# Patient Record
Sex: FEMALE | Race: WHITE
[De-identification: names, ages, dates, MRNs, and addresses within clinical notes are randomized per-mention and may not be internally consistent; named-entity substitution may affect disease eponyms.]

---

## 2018-12-06 ENCOUNTER — HOSPITAL ENCOUNTER (EMERGENCY)
Dept: HOSPITAL 88 - ER | Age: 78
Discharge: HOME | End: 2018-12-06
Payer: MEDICARE

## 2018-12-06 VITALS — BODY MASS INDEX: 36.86 KG/M2 | HEIGHT: 63 IN | WEIGHT: 208 LBS

## 2018-12-06 DIAGNOSIS — Z95.1: ICD-10-CM

## 2018-12-06 DIAGNOSIS — S76.912A: ICD-10-CM

## 2018-12-06 DIAGNOSIS — K21.9: ICD-10-CM

## 2018-12-06 DIAGNOSIS — I10: ICD-10-CM

## 2018-12-06 DIAGNOSIS — G89.29: ICD-10-CM

## 2018-12-06 DIAGNOSIS — M79.652: Primary | ICD-10-CM

## 2018-12-06 PROCEDURE — 99282 EMERGENCY DEPT VISIT SF MDM: CPT

## 2018-12-06 PROCEDURE — 93971 EXTREMITY STUDY: CPT

## 2019-11-22 ENCOUNTER — HOSPITAL ENCOUNTER (OUTPATIENT)
Dept: HOSPITAL 88 - ER | Age: 79
Setting detail: OBSERVATION
LOS: 2 days | Discharge: HOME | End: 2019-11-24
Attending: INTERNAL MEDICINE | Admitting: INTERNAL MEDICINE
Payer: MEDICARE

## 2019-11-22 VITALS — SYSTOLIC BLOOD PRESSURE: 122 MMHG | DIASTOLIC BLOOD PRESSURE: 55 MMHG

## 2019-11-22 VITALS — SYSTOLIC BLOOD PRESSURE: 150 MMHG | DIASTOLIC BLOOD PRESSURE: 65 MMHG

## 2019-11-22 VITALS — DIASTOLIC BLOOD PRESSURE: 65 MMHG | SYSTOLIC BLOOD PRESSURE: 150 MMHG

## 2019-11-22 VITALS — WEIGHT: 227 LBS | BODY MASS INDEX: 40.22 KG/M2 | HEIGHT: 63 IN

## 2019-11-22 VITALS — SYSTOLIC BLOOD PRESSURE: 143 MMHG | DIASTOLIC BLOOD PRESSURE: 59 MMHG

## 2019-11-22 DIAGNOSIS — E66.01: ICD-10-CM

## 2019-11-22 DIAGNOSIS — I25.2: ICD-10-CM

## 2019-11-22 DIAGNOSIS — Z85.850: ICD-10-CM

## 2019-11-22 DIAGNOSIS — E89.0: ICD-10-CM

## 2019-11-22 DIAGNOSIS — I13.0: Primary | ICD-10-CM

## 2019-11-22 DIAGNOSIS — E78.5: ICD-10-CM

## 2019-11-22 DIAGNOSIS — I50.33: ICD-10-CM

## 2019-11-22 DIAGNOSIS — I87.2: ICD-10-CM

## 2019-11-22 DIAGNOSIS — N18.9: ICD-10-CM

## 2019-11-22 DIAGNOSIS — I25.10: ICD-10-CM

## 2019-11-22 DIAGNOSIS — Z95.1: ICD-10-CM

## 2019-11-22 DIAGNOSIS — K21.9: ICD-10-CM

## 2019-11-22 LAB
ALBUMIN SERPL-MCNC: 3.7 G/DL (ref 3.5–5)
ALBUMIN/GLOB SERPL: 1 {RATIO} (ref 0.8–2)
ALP SERPL-CCNC: 78 IU/L (ref 40–150)
ALT SERPL-CCNC: 17 IU/L (ref 0–55)
ANION GAP SERPL CALC-SCNC: 16 MMOL/L (ref 8–16)
BASOPHILS # BLD AUTO: 0.1 10*3/UL (ref 0–0.1)
BASOPHILS NFR BLD AUTO: 0.8 % (ref 0–1)
BUN SERPL-MCNC: 25 MG/DL (ref 7–26)
BUN/CREAT SERPL: 21 (ref 6–25)
CALCIUM SERPL-MCNC: 8.3 MG/DL (ref 8.4–10.2)
CHLORIDE SERPL-SCNC: 100 MMOL/L (ref 98–107)
CK MB SERPL-MCNC: 2.5 NG/ML (ref 0–5)
CK MB SERPL-MCNC: 2.7 NG/ML (ref 0–5)
CK MB SERPL-MCNC: 3 NG/ML (ref 0–5)
CK SERPL-CCNC: 136 IU/L (ref 29–168)
CK SERPL-CCNC: 164 IU/L (ref 29–168)
CK SERPL-CCNC: 165 IU/L (ref 29–168)
CO2 SERPL-SCNC: 28 MMOL/L (ref 22–29)
DEPRECATED APTT PLAS QN: 35.4 SECONDS (ref 23.8–35.5)
DEPRECATED INR PLAS: 0.84
DEPRECATED NEUTROPHILS # BLD AUTO: 4.7 10*3/UL (ref 2.1–6.9)
EGFRCR SERPLBLD CKD-EPI 2021: 45 ML/MIN (ref 60–?)
EOSINOPHIL # BLD AUTO: 0.4 10*3/UL (ref 0–0.4)
EOSINOPHIL NFR BLD AUTO: 5.2 % (ref 0–6)
ERYTHROCYTE [DISTWIDTH] IN CORD BLOOD: 13.2 % (ref 11.7–14.4)
GLOBULIN PLAS-MCNC: 3.8 G/DL (ref 2.3–3.5)
GLUCOSE SERPLBLD-MCNC: 110 MG/DL (ref 74–118)
HCT VFR BLD AUTO: 38.1 % (ref 34.2–44.1)
HGB BLD-MCNC: 12.5 G/DL (ref 12–16)
LYMPHOCYTES # BLD: 2.2 10*3/UL (ref 1–3.2)
LYMPHOCYTES NFR BLD AUTO: 26.1 % (ref 18–39.1)
MCH RBC QN AUTO: 28.7 PG (ref 28–32)
MCHC RBC AUTO-ENTMCNC: 32.8 G/DL (ref 31–35)
MCV RBC AUTO: 87.6 FL (ref 81–99)
MONOCYTES # BLD AUTO: 1 10*3/UL (ref 0.2–0.8)
MONOCYTES NFR BLD AUTO: 11.9 % (ref 4.4–11.3)
NEUTS SEG NFR BLD AUTO: 55.6 % (ref 38.7–80)
PLATELET # BLD AUTO: 286 X10E3/UL (ref 140–360)
POTASSIUM SERPL-SCNC: 4 MMOL/L (ref 3.5–5.1)
PROTHROMBIN TIME: 12 SECONDS (ref 11.9–14.5)
RBC # BLD AUTO: 4.35 X10E6/UL (ref 3.6–5.1)
SODIUM SERPL-SCNC: 140 MMOL/L (ref 136–145)

## 2019-11-22 PROCEDURE — 85025 COMPLETE CBC W/AUTO DIFF WBC: CPT

## 2019-11-22 PROCEDURE — 99284 EMERGENCY DEPT VISIT MOD MDM: CPT

## 2019-11-22 PROCEDURE — 71046 X-RAY EXAM CHEST 2 VIEWS: CPT

## 2019-11-22 PROCEDURE — 80061 LIPID PANEL: CPT

## 2019-11-22 PROCEDURE — 36415 COLL VENOUS BLD VENIPUNCTURE: CPT

## 2019-11-22 PROCEDURE — 83880 ASSAY OF NATRIURETIC PEPTIDE: CPT

## 2019-11-22 PROCEDURE — 83735 ASSAY OF MAGNESIUM: CPT

## 2019-11-22 PROCEDURE — 96365 THER/PROPH/DIAG IV INF INIT: CPT

## 2019-11-22 PROCEDURE — 85610 PROTHROMBIN TIME: CPT

## 2019-11-22 PROCEDURE — 80048 BASIC METABOLIC PNL TOTAL CA: CPT

## 2019-11-22 PROCEDURE — 93306 TTE W/DOPPLER COMPLETE: CPT

## 2019-11-22 PROCEDURE — 85730 THROMBOPLASTIN TIME PARTIAL: CPT

## 2019-11-22 PROCEDURE — 80053 COMPREHEN METABOLIC PANEL: CPT

## 2019-11-22 PROCEDURE — 84484 ASSAY OF TROPONIN QUANT: CPT

## 2019-11-22 PROCEDURE — 93005 ELECTROCARDIOGRAM TRACING: CPT

## 2019-11-22 PROCEDURE — 82550 ASSAY OF CK (CPK): CPT

## 2019-11-22 PROCEDURE — 82553 CREATINE MB FRACTION: CPT

## 2019-11-22 RX ADMIN — FAMOTIDINE SCH MG: 20 TABLET, FILM COATED ORAL at 20:03

## 2019-11-22 RX ADMIN — PANTOPRAZOLE SODIUM SCH MG: 40 TABLET, DELAYED RELEASE ORAL at 15:00

## 2019-11-22 RX ADMIN — FUROSEMIDE SCH MG: 10 INJECTION, SOLUTION INTRAMUSCULAR; INTRAVENOUS at 22:15

## 2019-11-22 RX ADMIN — VALSARTAN SCH MG: 80 TABLET ORAL at 15:00

## 2019-11-22 RX ADMIN — Medication PRN MG: at 22:58

## 2019-11-22 RX ADMIN — FAMOTIDINE SCH MG: 20 TABLET, FILM COATED ORAL at 11:19

## 2019-11-22 RX ADMIN — ISOSORBIDE MONONITRATE SCH MG: 30 TABLET, EXTENDED RELEASE ORAL at 15:00

## 2019-11-22 NOTE — DIAGNOSTIC IMAGING REPORT
EXAMINATION:  CHEST 2 VIEWS    



INDICATION: Shortness of breath



COMPARISON: None

     

FINDINGS:



LINES/TUBES:Neck EKG



LUNGS:The lungs are moderately inflated. Mild biapical pleural parenchymal

thickening/scarring. There is perihilar fullness and indistinctness of the

pulmonary vasculature. 



PLEURA:No pleural effusion or pneumothorax.



MEDIASTINUM:Postoperative findings of prior CABG.



BONES/SOFT TISSUES:No acute osseous injury. Sternotomy wires intact.



ABDOMEN:No free air under the diaphragm.





IMPRESSION: 

Minimal pulmonary interstitial edema. Unchanged mild cardiomegaly.



Signed by: Vivian Mccoy MD on 11/22/2019 11:33 AM

## 2019-11-22 NOTE — XMS REPORT
Summary of Care

                             Created on: 2019



Marii Hewitt

External Reference #: [id number]

: 1940

Sex: Female



Demographics







                          Address                   [address] [place]  [address]

 

                          Home Phone                [phone]

 

                          Preferred Language        English

 

                          Marital Status            

 

                          Yarsanism Affiliation     000

 

                          Race                      White

 

                          Ethnic Group              Non-





Author







                          Author                    [facility]

 

                          Organization              [facility]

 

                          Address                   Unknown

 

                          Phone                     Unavailable







Support







                Name            Relationship    Address         Phone

 

                    Eddie Hewitt     ECON                [address] [place]  [address]                     [phone]







Care Team Providers







                    Care Team Member Name    Role                Phone

 

                    Ana Luisa Bautista MD    PCP                 [phone]







Encounter Details







                          Care Team                 Description



                     Date                Type                Department  

 

                                        



Doctor Unassigned, [place]



[address] [place] [address]                      



                     2019          Patient Secure      [facility]  



                           Msg                       [address] [place] [id number] [phone]  







Allergies







                                        Comments



                 Active Allergy     Reactions       Severity        Noted Date 

 

                                        



Dry Mouth



                 Lisinopril      Other - See     High            2019 



                                         comments   



documented as of this encounter (statuses as of 2019)



Medications







                          End Date                  Status



              Medication     Sig          Dispensed     Refills      Start  



                                         Date  

 

                                                    Active



                 gabapentin 300 mg capsule     Take 600 mg      5                 



                           by mouth at               8  



                                         bedtime.     

 

                                                    Active



                 pantoprazole 40 mg EC     TK 1 T PO       3                 



                     tablet              ONCE D              8  

 

                                                    Active



                 metoprolol succinate XL     TK 1/2 T PO      3                 



                     25 mg 24 hr tablet     ONCE D              8  

 

                                                    Active



                 isosorbide mononitrate 60     TK 1 T PO QD      3                 



                           mg 24 hr tablet           8  

 

                                                    Active



                 furosemide 40 mg tablet     TK 1 T PO  D      3                 



                                         8  

 

                                                    Active



                 calcitriol 0.25 mcg     TK 2 CS PO QD      0                 



                           capsule                   8  

 

                                                    Active



                     aspirin (ASPIRIN LOW     Take 81 mg by       0   



                           DOSE) 81 mg EC tablet     mouth daily.     

 

                                                    Active



                     valsartan (DIOVAN) 40 mg     Take 40 mg by       0   



                           tablet                    mouth daily.     

 

                                                    Active



                     calcium carbonate/vitamin     Take  by            0   



                           D2 (CALCIUM-600-D ORAL)     mouth.     

 

                                                    Active



                     losartan potassium     Take  by            0   



                           (LOSARTAN                 mouth.     



                                         ORAL)Indications:      



                                         Essential hypertension      

 

                                                    Active



                     promethazine-dextromethor     Take 5 mL by        0   



                           tabares 6.25-15 mg/5 mL      mouth 4     



                           syrup                     (four) times     



                                         daily as     



                                         needed for     



                                         Cough.     

 

                                                    Active



              methylPREDNISolone     Take  by     21 Each      0              



                     (MEDROL, DESTINY,) 4 mg     mouth               9  



                           tabletsIndications:       SEE-INSTRUCTI     



                           Bronchitis                ONS. follow     



                                         package     



                                         directions     

 

                                                    Active



              rOPINIRole 0.5 mg     Take 1 tablet     90 tablet     1              



                     tabletIndications:     by mouth at         9  



                           Restless leg syndrome     bedtime.     

 

                                                    Active



                     VITAMIN K2 ORAL     Take 1 tablet       0   



                                         by mouth     



                                         daily.     

 

                                                    Active



              levothyroxine 112 mcg     TAKE 1 TABLET     60 tablet     0              



                     tablet              BY MOUTH            9  



                                         EVERY MORNING     



                                         ON AN EMPTY     



                                         STOMACH WITH     



                                         WATER, AND     



                                         WAIT 1 HOUR     



                                         BEFORE EATING     



                                         OR TAKING     



                                         OTHER MEDS     



documented as of this encounter (statuses as of 2019)



Active Problems





Not on filedocumented as of this encounter (statuses as of 2019)



Social History







                                        Date



                 Tobacco Use     Types           Packs/Day       Years Used 

 

                                         



                                         Never Smoker    

 

    



                                         Smokeless Tobacco: Never   



                                         Used   









                    Drinks/Week         oz/Week             Comments



                                         Alcohol Use   

 

                                                             



                                         Yes   









 



                           Sex Assigned at Birth     Date Recorded

 

 



                                         Not on file 









                                        Industry



                           Job Start Date            Occupation 

 

                                        Not on file



                           Not on file               Not on file 









                                        Travel End



                           Travel History            Travel Start 

 





                                         No recent travel history available.



documented as of this encounter



Last Filed Vital Signs

Not on filedocumented in this encounter



Plan of Treatment







   



                 Health Maintenance     Due Date        Last Done       Comments

 

   



                           DTaP,Tdap,and Td Vaccines     1960  



                                         (1 - Tdap)   

 

   



                           Zoster Recombinant        1991  



                                         Vaccine (SHINGRIX) (1 of   



                                         2)   

 

   



                           Medicare Wellness Visit     2006  

 

   



                           Osteoporosis Screening     2006  

 

   



                           PNEUMOCOCCAL VACCINES 65+     2006  



                                         (1 of 2 - PCV13)   

 

   



                           INFLUENZA VACCINE         2019  



documented as of this encounter



Results

Not on filedocumented in this encounter



Insurance







                                        Type



            Payer      Benefit     Subscriber ID     Effective     Phone      Address 



                           Plan /                    Dates   



                                         Group     

 

                                        Medicare Adv O



                 [facility] -     [employer]            [id number]       2019-P   



                     MANAGED MEDICARE     MEDICARE            resent   



                                         COMPLETE     



documented as of this encounter

## 2019-11-22 NOTE — XMS REPORT
Summary of Care

                             Created on: 2019



Marii Hewitt

External Reference #: [id number]

: 1940

Sex: Female



Demographics







                          Address                   [address] [place] [address]  [address]

 

                          Home Phone                [phone]

 

                          Preferred Language        English

 

                          Marital Status            

 

                          Orthodox Affiliation     000

 

                          Race                      White

 

                          Ethnic Group              Non-





Author







                          Author                    [facility]

 

                          Organization              [facility]

 

                          Address                   Unknown

 

                          Phone                     Unavailable







Support







                Name            Relationship    Address         Phone

 

                    Eddie Hewitt     [employer]                [address] [place]  [address]                     [phone]







Care Team Providers







                    Care Team Member Name    Role                Phone

 

                    Ana Luisa Bautista MD    PCP                 [phone]







Reason for Visit

* 





 



                           Reason                    Comments

 

 



                                         Refill Request 









Encounter Details







                          Care Team                 Description



                     Date                Type                Department  

 

                                        



Ana Luisa Bautista MD



[address] [place] [address] [phone] (Fax)                      Refill Request



                     2019          Telephone           [facility]  



                                         Adult Primary Care, [address]  



                                         floor  



                                         [place] [address] [phone]  







Allergies







                                        Comments



                 Active Allergy     Reactions       Severity        Noted Date 

 

                                        



Dry Mouth



                 Lisinopril      Other - See     High            2019 



                                         comments   



documented as of this encounter (statuses as of 2019)



Medications







                          End Date                  Status



              Medication     Sig          Dispensed     Refills      Start  



                                         Date  

 

                                                    Active



                 gabapentin 300 mg capsule     Take 600 mg      5                 



                           by mouth at               8  



                                         bedtime.     

 

                                                    Active



                 pantoprazole 40 mg EC     TK 1 T PO       3                 



                     tablet              ONCE D              8  

 

                                                    Active



                 metoprolol succinate XL     TK 1/2 T PO      3                 



                     25 mg 24 hr tablet     ONCE D              8  

 

                                                    Active



                 isosorbide mononitrate 60     TK 1 T PO QD      3                 



                           mg 24 hr tablet           8  

 

                                                    Active



                 furosemide 40 mg tablet     TK 1 T PO  D      3                 



                                         8  

 

                                                    Active



                 calcitriol 0.25 mcg     TK 2 CS PO QD      0                 



                           capsule                   8  

 

                                                    Active



                     aspirin (ASPIRIN LOW     Take 81 mg by       0   



                           DOSE) 81 mg EC tablet     mouth daily.     

 

                                                    Active



                     valsartan (DIOVAN) 40 mg     Take 40 mg by       0   



                           tablet                    mouth daily.     

 

                                                    Active



                     calcium carbonate/vitamin     Take  by            0   



                           D2 (CALCIUM-600-D ORAL)     mouth.     

 

                                                    Active



                     losartan potassium     Take  by            0   



                           (LOSARTAN                 mouth.     



                                         ORAL)Indications:      



                                         Essential hypertension      

 

                                                    Active



                     promethazine-dextromethor     Take 5 mL by        0   



                           tabares 6.25-15 mg/5 mL      mouth 4     



                           syrup                     (four) times     



                                         daily as     



                                         needed for     



                                         Cough.     

 

                                                    Active



              methylPREDNISolone     Take  by     21 Each      0              



                     (MEDROL, DESTINY,) 4 mg     mouth               9  



                           tabletsIndications:       SEE-INSTRUCTI     



                           Bronchitis                ONS. follow     



                                         package     



                                         directions     

 

                                                    Active



              rOPINIRole 0.5 mg     Take 1 tablet     90 tablet     1              



                     tabletIndications:     by mouth at         9  



                           Restless leg syndrome     bedtime.     

 

                                                    Active



                     VITAMIN K2 ORAL     Take 1 tablet       0   



                                         by mouth     



                                         daily.     

 

                                                    Active



              losartan 25 mg     Take 1 tablet     30 tablet     1              



                     tabletIndications:     by mouth            9  



                           Essential hypertension     daily.     

 

                                                    Active



              POTASSIUM CHLORIDE 10 mEq     TAKE 1 TABLET     30 tablet     0              



                     CR tablet           BY MOUTH            9  



                                         EVERY DAY     

 

                                                    Active



              levothyroxine 112 mcg     TAKE 1 TABLET     90 tablet     0              



                     tablet              BY MOUTH            9  



                                         EVERY MORNING     



                                         ON AN EMPTY     



                                         STOMACH WITH     



                                         WATER, AND     



                                         WAIT 1 HOUR     



                                         BEFORE EATING     



                                         OR TAKING     



                                         OTHER MEDS     

 

                          2019                Discontinued



              levothyroxine 112 mcg     TAKE 1 TABLET     60 tablet     0              



                     tablet              BY MOUTH            9  



                                         EVERY MORNING     



                                         ON AN EMPTY     



                                         STOMACH WITH     



                                         WATER, AND     



                                         WAIT 1 HOUR     



                                         BEFORE EATING     



                                         OR TAKING     



                                         OTHER MEDS     



documented as of this encounter (statuses as of 2019)



Active Problems





Not on filedocumented as of this encounter (statuses as of 2019)



Social History







                                        Date



                 Tobacco Use     Types           Packs/Day       Years Used 

 

                                         



                                         Never Smoker    

 

    



                                         Smokeless Tobacco: Never   



                                         Used   









                    Drinks/Week         oz/Week             Comments



                                         Alcohol Use   

 

                                                             



                                         Yes   









 



                           Sex Assigned at Birth     Date Recorded

 

 



                                         Not on file 









                                        Industry



                           Job Start Date            Occupation 

 

                                        Not on file



                           Not on file               Not on file 









                                        Travel End



                           Travel History            Travel Start 

 





                                         No recent travel history available.



documented as of this encounter



Last Filed Vital Signs

Not on filedocumented in this encounter



Plan of Treatment







                          Care Team                 Description



                     Date                Type                Specialty  

 

                                        



Ana Luisa Bautista MD



[address] [place] [id number] [phone] (Fax)                       



                     2019          Office Visit        Family Medicine  









   



                 Health Maintenance     Due Date        Last Done       Comments

 

   



                           DTaP,Tdap,and Td Vaccines     1960  



                                         (1 - Tdap)   

 

   



                           Zoster Recombinant        1991  



                                         Vaccine (SHINGRIX) (1 of   



                                         2)   

 

   



                           Medicare Wellness Visit     2006  

 

   



                           Osteoporosis Screening     2006  

 

   



                           PNEUMOCOCCAL VACCINES 65+     2006  



                                         (1 of 2 - PCV13)   

 

   



                           INFLUENZA VACCINE (#1)     2019  



documented as of this encounter



Results

Not on filedocumented in this encounter



Insurance







                                        Type



            Payer      Benefit     Subscriber ID     Effective     Phone      Address 



                           Plan /                    Dates   



                                         Group     

 

                                        Medicare Adv O



                 [facility] -     [employer]            [id number]       2019-P   



                     MANAGED MEDICARE     MEDICARE            resent   



                                         COMPLETE     



documented as of this encounter

## 2019-11-22 NOTE — XMS REPORT
Summary of Care

                             Created on: 2019



Marii Hewitt

External Reference #: [id number]

: 1940

Sex: Female



Demographics







                          Address                   [address] [place] [address]  [address]

 

                          Home Phone                [phone]

 

                          Preferred Language        English

 

                          Marital Status            

 

                          Mormonism Affiliation     000

 

                          Race                      White

 

                          Ethnic Group              Non-





Author







                          Author                    [facility]

 

                          Organization              [facility]

 

                          Address                   Unknown

 

                          Phone                     Unavailable







Support







                Name            Relationship    Address         Phone

 

                    Eddie Hewitt     [employer]                [address] [place]  [address]                     [phone]







Care Team Providers







                    Care Team Member Name    Role                Phone

 

                    Ana Luisa Bautista MD    PCP                 [phone]







Reason for Visit

* 





 



                           Reason                    Comments

 

 



                                         Pain 









Encounter Details







                          Care Team                 Description



                     Date                Type                Department  

 

                                        



Ana Luisa Bautista MD



[address]



[place] [address]



[phone] (Fax)                      Restless leg syndrome (Primary Dx); 

Motion sickness, initial encounter; 

Diarrhea, unspecified type; 

Acquired hypothyroidism



                     2019          Office Visit        [facility]  



                                         [facility]  



                                         [facility]  



                                         [address]  



                                         [place]  



                                         [address] [phone]  







Allergies







                                        Comments



                 Active Allergy     Reactions       Severity        Noted Date 

 

                                        



Dry Mouth



                 Lisinopril      Other - See     High            2019 



                                         comments   



documented as of this encounter (statuses as of 2019)



Medications







                          End Date                  Status



              Medication     Sig          Dispensed     Refills      Start  



                                         Date  

 

                                                    Active



                 gabapentin 300 mg capsule     Take 600 mg      5                 



                           by mouth at               8  



                                         bedtime.     

 

                                                    Active



                 pantoprazole 40 mg EC     TK 1 T PO       3                 



                     tablet              ONCE D              8  

 

                                                    Active



                 metoprolol succinate XL     TK 1/2 T PO      3                 



                     25 mg 24 hr tablet     ONCE D              8  

 

                                                    Active



                 isosorbide mononitrate 60     TK 1 T PO QD      3                 



                           mg 24 hr tablet           8  

 

                                                    Active



                 furosemide 40 mg tablet     TK 1 T PO  D      3                 



                                         8  

 

                                                    Active



                 calcitriol 0.25 mcg     TK 2 CS PO QD      0                 



                           capsule                   8  

 

                                                    Active



                     aspirin (ASPIRIN LOW     Take 81 mg by       0   



                           DOSE) 81 mg EC tablet     mouth daily.     

 

                                                    Active



                     valsartan (DIOVAN) 40 mg     Take 40 mg by       0   



                           tablet                    mouth daily.     

 

                                                    Active



                     calcium carbonate/vitamin     Take  by            0   



                           D2 (CALCIUM-600-D ORAL)     mouth.     

 

                                                    Active



                     losartan potassium     Take  by            0   



                           (LOSARTAN                 mouth.     



                                         ORAL)Indications:      



                                         Essential hypertension      

 

                                                    Active



                     promethazine-dextromethor     Take 5 mL by        0   



                           tabares 6.25-15 mg/5 mL      mouth 4     



                           syrup                     (four) times     



                                         daily as     



                                         needed for     



                                         Cough.     

 

                                                    Active



              methylPREDNISolone     Take  by     21 Each      0              



                     (MEDROL, DESTINY,) 4 mg     mouth               9  



                           tabletsIndications:       SEE-INSTRUCTI     



                           Bronchitis                ONS. follow     



                                         package     



                                         directions     

 

                                                    Active



                     VITAMIN K2 ORAL     Take 1 tablet       0   



                                         by mouth     



                                         daily.     

 

                                                    Active



              losartan 25 mg     Take 1 tablet     30 tablet     1              



                     tabletIndications:     by mouth            9  



                           Essential hypertension     daily.     

 

                                                    Active



              levothyroxine 112 mcg     TAKE 1 TABLET     90 tablet     0              



                     tablet              BY MOUTH            9  



                                         EVERY MORNING     



                                         ON AN EMPTY     



                                         STOMACH WITH     



                                         WATER, AND     



                                         WAIT 1 HOUR     



                                         BEFORE EATING     



                                         OR TAKING     



                                         OTHER MEDS     

 

                                                    Active



              POTASSIUM CHLORIDE 10 mEq     TAKE 1 TABLET     30 tablet     0              



                     CR tablet           BY MOUTH            9  



                                         EVERY DAY     

 

                                                    Active



                     ergocalciferol, vitamin     Take 4,000          0   



                           D2, (VITAMIN D ORAL)      Units by     



                                         mouth daily.     

 

                                                    Active



              scopolamine transdermal 1     Apply 1 Patch     4 Patch      0              



                     mg over 3 days      to area(s)          9  



                           patchIndications: Motion     every 72     



                           sickness, initial         (seventy-two)     



                           encounter                 hours.     

 

                                                    Active



              rOPINIRole 0.5 mg     2 tablets 1-3     90 tablet     0              



                     tabletIndications:     hrs prior to        9  



                           Restless leg syndrome     bedtime x 7     



                                         days then if     



                                         needed can go     



                                         up weekly by     



                                         0.5 mg until     



                                         desired     



                                         effect or she     



                                         reaches 3 mg.     

 

                                                    Active



              imipramine 25 mg     Take 1 tablet     30 tablet     2              



                     tabletIndications:     by mouth            9  



                           Diarrhea, unspecified     daily.     



                                         type      

 

                          2019                Discontinued



              rOPINIRole 0.5 mg     Take 1 tablet     90 tablet     1              



                     tabletIndications:     by mouth at         9  



                           Restless leg syndrome     bedtime.     



documented as of this encounter (statuses as of 2019)



Active Problems





Not on filedocumented as of this encounter (statuses as of 2019)



Social History







                                        Date



                 Tobacco Use     Types           Packs/Day       Years Used 

 

                                         



                                         Never Smoker    

 

    



                                         Smokeless Tobacco: Never   



                                         Used   









                    Drinks/Week         oz/Week             Comments



                                         Alcohol Use   

 

                                                             



                                         Yes   









 



                           Sex Assigned at Birth     Date Recorded

 

 



                                         Not on file 









                                        Industry



                           Job Start Date            Occupation 

 

                                        Not on file



                           Not on file               Not on file 









                                        Travel End



                           Travel History            Travel Start 

 





                                         No recent travel history available.



documented as of this encounter



Last Filed Vital Signs







                    Reading             Time Taken          Comments



                                         Vital Sign   

 

                    134/82              2019  3:42 PM CDT     



                                         Blood Pressure   

 

                    64                  2019  3:42 PM CDT     



                                         Pulse   

 

                    -                   -                    



                                         Temperature   

 

                    -                   -                    



                                         Respiratory Rate   

 

                    99%                 2019  3:42 PM CDT     



                                         Oxygen Saturation   

 

                    -                   -                    



                                         Inhaled Oxygen   



                                         Concentration   

 

                    103.1 kg (227 lb 3.2 oz)    2019  3:42 PM CDT     



                                         Weight   

 

                    -                   -                    



                                         Height   

 

                    42.58               2019  2:02 PM CDT     



                                         Body Mass Index   



documented in this encounter



Progress Notes

* Ana Luisa Bautista MD - 2019  3:00 PM CDT



Formatting of this note might be different from the original.

Cc: 

Chief Complaint 

Patient presents with 

 Pain 



Marii Hewitt is a 79 year old female.

HPI 

She is going on a cruise in October and is requesting motion sickness pills.

Patient states that for years she has had issues with on and off diarrhea .  But
lately she has had issues with random unprovoked episodes of diarrhea that she 
has no control over.  

In both ears she has had a constant swooshing sound for 2 weeks.  Patient states
Dr. Short stopped her calcitriol.  she is now taking Calcium 1800 mg and vitamin D 
4000 iu.  Patient states that her calcium level is low.  Patient states that ha
ving bone and muscle achiness, slight better when she re-started her calcium sup
plement.

Check how much parathyroid she has left.  

Neuropathy pain comes and goes in degrees.  ropiranole will help.  

Patient states she has a tightening of the throat. Makes it hard to move her jaw
.  She denies any shortness of breath.

She has itching under her underarm.  Patient states that has bumps and itching i
n groin. Patient states on and off.  

Patient saw Dr. Cartwright.  She is setting her up for a Nuclear stress test and Ec
hocardiogram.  Stress test will be done after she returns from her cruise.  



Allergies

Marii is allergic to lisinopril.



Medications

Outpatient Medications Prior to Visit 

Medication Sig Dispense Refill 

 ergocalciferol, vitamin D2, (VITAMIN D ORAL) Take 4,000 Units by mouth daily
.   

 POTASSIUM CHLORIDE 10 mEq CR tablet TAKE 1 TABLET BY MOUTH EVERY DAY 30 tabl
et 0 

 levothyroxine 112 mcg tablet TAKE 1 TABLET BY MOUTH EVERY MORNING ON AN EMPT
Y STOMACH WITH WATER, AND WAIT 1 HOUR BEFORE EATING OR TAKING OTHER MEDS 90 tabl
et 0 

 losartan 25 mg tablet Take 1 tablet by mouth daily. 30 tablet 1 

 VITAMIN K2 ORAL Take 1 tablet by mouth daily.   

 rOPINIRole 0.5 mg tablet Take 1 tablet by mouth at bedtime. 90 tablet 1 

 aspirin (ASPIRIN LOW DOSE) 81 mg EC tablet Take 81 mg by mouth daily.   

 furosemide 40 mg tablet TK 1 T PO  D  3 

 gabapentin 300 mg capsule Take 600 mg by mouth at bedtime.  5 

 isosorbide mononitrate 60 mg 24 hr tablet TK 1 T PO QD  3 

 metoprolol succinate XL 25 mg 24 hr tablet TK 1/2 T PO ONCE D  3 

 pantoprazole 40 mg EC tablet TK 1 T PO ONCE D  3 

 methylPREDNISolone (MEDROL, DESTINY,) 4 mg tablets Take  by mouth SEE-INSTRUCTIO
NS. follow package directions 21 Each 0 

 promethazine-dextromethorphan 6.25-15 mg/5 mL syrup Take 5 mL by mouth 4 (fo
ur) times daily as needed for Cough.   

 losartan potassium (LOSARTAN ORAL) Take  by mouth.   

 calcitriol 0.25 mcg capsule TK 2 CS PO QD  0 

 calcium carbonate/vitamin D2 (CALCIUM-600-D ORAL) Take  by mouth.   

 valsartan (DIOVAN) 40 mg tablet Take 40 mg by mouth daily.   



No facility-administered medications prior to visit.  



Current Outpatient Medications: 

  ergocalciferol, vitamin D2, (VITAMIN D ORAL), Take 4,000 Units by mouth jasson gonzales, Disp: , Rfl: 

  scopolamine transdermal 1 mg over 3 days patch, Apply 1 Patch to area(s) ev
jt 72 (seventy-two) hours., Disp: 4 Patch, Rfl: 0

  POTASSIUM CHLORIDE 10 mEq CR tablet, TAKE 1 TABLET BY MOUTH EVERY DAY, Disp
: 30 tablet, Rfl: 0

  levothyroxine 112 mcg tablet, TAKE 1 TABLET BY MOUTH EVERY MORNING ON AN EM
PTY STOMACH WITH WATER, AND WAIT 1 HOUR BEFORE EATING OR TAKING OTHER MEDS, Disp
: 90 tablet, Rfl: 0

  losartan 25 mg tablet, Take 1 tablet by mouth daily., Disp: 30 tablet, Rfl:
1

  VITAMIN K2 ORAL, Take 1 tablet by mouth daily., Disp: , Rfl: 

  rOPINIRole 0.5 mg tablet, Take 1 tablet by mouth at bedtime., Disp: 90 tabl
et, Rfl: 1

  aspirin (ASPIRIN LOW DOSE) 81 mg EC tablet, Take 81 mg by mouth daily., Dis
p: , Rfl: 

  furosemide 40 mg tablet, TK 1 T PO  D, Disp: , Rfl: 3

  gabapentin 300 mg capsule, Take 600 mg by mouth at bedtime., Disp: , Rfl: 5

  isosorbide mononitrate 60 mg 24 hr tablet, TK 1 T PO QD, Disp: , Rfl: 3

  metoprolol succinate XL 25 mg 24 hr tablet, TK 1/2 T PO ONCE D, Disp: , Rfl
: 3

  pantoprazole 40 mg EC tablet, TK 1 T PO ONCE D, Disp: , Rfl: 3

  methylPREDNISolone (MEDROL, DESTINY,) 4 mg tablets, Take  by mouth SEE-INSTRUCT
IONS. follow package directions, Disp: 21 Each, Rfl: 0

  promethazine-dextromethorphan 6.25-15 mg/5 mL syrup, Take 5 mL by mouth 4 (
four) times daily as needed for Cough., Disp: , Rfl: 

  losartan potassium (LOSARTAN ORAL), Take  by mouth., Disp: , Rfl: 

  calcitriol 0.25 mcg capsule, TK 2 CS PO QD, Disp: , Rfl: 0

  calcium carbonate/vitamin D2 (CALCIUM-600-D ORAL), Take  by mouth., Disp: ,
Rfl: 

  valsartan (DIOVAN) 40 mg tablet, Take 40 mg by mouth daily., Disp: , Rfl: 



Histories

Past Medical History: 

Diagnosis Date 

 B12 deficiency  

 CHF (congestive heart failure)  

 Coronary artery disease  

 Diverticulosis  

 Gallstones  

 Heart murmur  

 Hypertension  

 Hypothyroid  

 Insomnia  

 Internal hemorrhoids  

 Meniere's disease  



Past Surgical History: 

Procedure Laterality Date 

 D&C AFTER DELIVERY   

 THYROIDECTOMY   



Social History 



Socioeconomic History 

 Marital status:  

  Spouse name: Not on file 

 Number of children: Not on file 

 Years of education: Not on file 

 Highest education level: Not on file 

Occupational History 

 Not on file 

Social Needs 

 Financial resource strain: Not on file 

 Food insecurity: 

  Worry: Not on file 

  Inability: Not on file 

 Transportation needs: 

  Medical: Not on file 

  Non-medical: Not on file 

Tobacco Use 

 Smoking status: Never Smoker 

 Smokeless tobacco: Never Used 

Substance and Sexual Activity 

 Alcohol use: Yes 

 Drug use: No 

 Sexual activity: Never 

Lifestyle 

 Physical activity: 

  Days per week: Not on file 

  Minutes per session: Not on file 

 Stress: Not on file 

Relationships 

 Social connections: 

  Talks on phone: Not on file 

  Gets together: Not on file 

  Attends Scientologist service: Not on file 

  Active member of club or organization: Not on file 

  Attends meetings of clubs or organizations: Not on file 

  Relationship status: Not on file 

 Intimate partner violence: 

  Fear of current or ex partner: Not on file 

  Emotionally abused: Not on file 

  Physically abused: Not on file 

  Forced sexual activity: Not on file 

Other Topics Concern 

 Not on file 

Social History Narrative 

 Not on file 



Family History 

Problem Relation Age of Onset 

 Diabetes Mother  

 Hypertension Mother  

 Heart Mother  

     TIA 

 Depression Mother  

 Hypertension Father  

 Diabetes Father  

 Heart Father  



Review of Systems 

Constitutional: Negative.  

Cardiovascular: Negative.  

Gastrointestinal: Positive for abdominal pain and diarrhea. 

Genitourinary: Negative.  

Musculoskeletal: Positive for arthralgias and myalgias. Negative for neck pain. 

Skin: Positive for rash. 

Neurological: Positive for numbness. Negative for headaches. 

     Restless legs 

Psychiatric/Behavioral: Positive for sleep disturbance. 

Endocrine: Endocrine negative



Vital Signs

/82 (BP Location: Left arm, Patient Position: Sitting)  | Pulse 64  | Wt 2
27 lb 3.2 oz (103.1 kg)  | SpO2 99%  | BMI 42.58 kg/m 



Physical Exam 

Constitutional: She appears well-developed and well-nourished. 

HENT: 

Right Ear: External ear normal. 

Left Ear: External ear normal. 

Mouth/Throat: Oropharynx is clear and moist. 

Eyes: Conjunctivae are normal. 

Neck: Neck supple. 

Cardiovascular: Normal rate, regular rhythm and normal heart sounds. 

Pulmonary/Chest: Effort normal and breath sounds normal. No respiratory distress
. 

Musculoskeletal: Normal range of motion. 

Lymphadenopathy: 

  She has no cervical adenopathy. 

Neurological: She is alert. 

Vitals reviewed.



Assessment/Plan

1. Restless leg syndrome

Will increase and maximize patients Ropinirole.  If this doesn't help we may nee
d to increase her gabapentin.

- rOPINIRole 0.5 mg tablet; 2 tablets 1-3 hrs prior to bedtime x 7 days then if 
needed can go up weekly by 0.5 mg until desired effect or she reaches 3 mg.  Dis
pense: 90 tablet; Refill: 0



2. Motion sickness, initial encounter

- scopolamine transdermal 1 mg over 3 days patch; Apply 1 Patch to area(s) every
72 (seventy-two) hours.  Dispense: 4 Patch; Refill: 0



3. Diarrhea, unspecified type

Will try patient on Imipramine for the diarrhea and will check her blood work to
make sure her diarrhea is not metabolically induced.  Review of her labs show t
hat all her labs are essentially normal.  

- imipramine 25 mg tablet; Take 1 tablet by mouth daily.  Dispense: 30 tablet; R
efill: 2

- CBC WITH DIFFERENTIAL

- COMP. METABOLIC PANEL (53428)

- LIPASE



4. Acquired hypothyroidism

In may her levels were in the hyperthyroid side which can cause diarrhea, repeat
shows normal value.  

- FREE T4

- THYROID STIMULATING HORMONE



This visit did not involve counseling and coordination that comprised more than 
50% of the visit time. 



Electronically signed by Ana Luisa Bautista MD at 2019  4:11 PM CDT

documented in this encounter



Plan of Treatment







                          Care Team                 Description



                     Date                Type                Specialty  

 

                                        



Ana Luisa Bautista MD



[address] [place] [id number] [phone] (Fax)                       



                     2020          Office Visit        Family Medicine  









   



                 Health Maintenance     Due Date        Last Done       Comments

 

   



                           DTaP,Tdap,and Td Vaccines     1960  



                                         (1 - Tdap)   

 

   



                           Zoster Recombinant        1991  



                                         Vaccine (SHINGRIX) (1 of   



                                         2)   

 

   



                           Medicare Wellness Visit     2006  

 

   



                           Osteoporosis Screening     2006  

 

   



                           PNEUMOCOCCAL VACCINES 65+     2006  



                                         (1 of 2 - PCV13)   

 

   



                           INFLUENZA VACCINE (#1)     2019  



documented as of this encounter



Procedures







                                        Comments



                 Procedure Name     Priority        Date/Time       Associated Diagnosis 

 

                                        



Results for this procedure are in the results section.



                 CBC WITH DIFFERENTIAL     Routine         2019      Diarrhea, unspecified 



                           5:21 PM CDT               type 

 

                                        



Results for this procedure are in the results section.



                 COMP. METABOLIC PANEL     Routine         2019      Diarrhea, unspecified 



                     (86868)             5:21 PM CDT         type 

 

                                        



Results for this procedure are in the results section.



                 THYROID STIMULATING     Routine         2019      Acquired hypothyroidism 



                           HORMONE                   5:21 PM CDT  

 

                                        



Results for this procedure are in the results section.



                 FREE T4         Routine         2019      Acquired hypothyroidism 



                                         5:21 PM CDT  

 

                                        



Results for this procedure are in the results section.



                 LIPASE          Routine         2019      Diarrhea, unspecified 



                           5:21 PM CDT               type 



documented in this encounter



Results

* LIPASE (2019  5:21 PM CDT)





    



              Component     Value        Ref Range     Performed At     Pathologist



                                         Signature

 

    



                 LIPASE          42              0 - 220 U/L     [facility] 



                                         [facility] 













                                         Specimen

 





                                         Blood - ARM, RIGHT









   



                 Performing Organization     Address         City/[facility]/Zipcode     Phone Number

 

   



                 [facility]     CLIA: [id number], [address] [place] [address]     [phone]





                           [facility]   





* THYROID STIMULATING HORMONE (2019  5:21 PM CDT)





    



              Component     Value        Ref Range     Performed At     Pathologist



                                         Signature

 

    



                 TSH             1.88            0.45 - 4.70 mIU/L     [facility] 



                                         [facility] 













                                         Specimen

 





                                         Blood - ARM, RIGHT









   



                 Performing Organization     Address         City/State/Zipcode     Phone Number

 

   



                 [facility]     CLIA: [id number], [address] [place] [address]     [phone]





                           [facility]   





* FREE T4 (2019  5:21 PM CDT)





    



              Component     Value        Ref Range     Performed At     Pathologist



                                         Signature

 

    



                 FREE T4         1.34            0.78 - 2.20 ng/dL:     [facility] 



                                         [facility] 













                                         Specimen

 





                                         Blood - ARM, RIGHT









   



                 Performing Organization     Address         City/[facility]/Zipcode     Phone Number

 

   



                 [facility]     CLIA:  [id number], [address] [place] [id number] [phone]





                                         [address]  





* COMP. METABOLIC PANEL (12239) (2019  5:21 PM CDT)





    



              Component     Value        Ref Range     Performed At     Pathologist



                                         Signature

 

    



                 NA              143             135 - 145 mmol/L     [facility] 



                                         [facility] 

 

    



                 K               4.4             3.5 - 5.0 mmol/L     [facility] 



                                         [facility] 

 

    



                 CL              102             98 - 108 mmol/L     [facility] 



                                         [facility] 

 

    



                 CO2 TOTAL       30              23 - 31 mmol/L     [facility] 



                                         [facility] 

 

    



                 AGAP            11              2 - 16          [facility] 



                                         [facility] 

 

    



                 BUN             37 (H)          7 - 23 mg/dL     [facility] 



                                         [facility] 

 

    



                 GLUCOSE         107             70 - 110 mg/dL     [facility] 



                                         [facility] 

 

    



                 CREATININE      1.25 (H)        0.50 - 1.04 mg/dL     [facility] 



                                         [facility] 

 

    



                 TOTAL BILI      0.6             0.1 - 1.1 mg/dL     [facility] 



                                         [facility] 

 

    



                 CALCIUM         8.2 (L)         8.6 - 10.6 mg/dL     [facility] 



                                         [facility] 

 

    



                 T PROTEIN       7.6             6.3 - 8.2 g/dL     [facility] 

 

    



                 ALBUMIN         4.2             3.5 - 5.0 g/dL     [facility] 



                                         [facility] 

 

    



                 ALK PHOS        70              34 - 122 U/L     [facility] 

 

    



                 ALT(SGPT)       19              9 - 51 U/L      [facility] 

 

    



                 AST(SGOT)       27              13 - 40 U/L     [facility] 



                                         [facility] 

 

    



                 eGFR            41.4            mL/min/1.73m2     [facility] 



                           Calculation               [facility] 



                           ([facility] 



                                         American)    

 

    



                 eGFR            50.2            mL/min/1.73m2     [facility] 



                           Calculation               [facility] 



                           ([facility] 



                                         American)    













                                         Specimen

 





                                         Blood - ARM, RIGHT









 



                           Narrative                 Performed At

 

 



                                         Association of Glomerular Filtration Rate (GFR) and Staging of Kidney Disease* 

                                         [facility]



                           +-----------------------+---------------------+-------------------------+     [facility]



                           | GFR (mL/min/1.73 m2)| With Kidney Damage|Without Kidney Damage     

CAMPUS



                                         +-----------------------+---------------------+-------------------------+ 



                                         |>90|Stage 



                                         one| Normal 



                                         +-----------------------+---------------------+-------------------------+ 



                                         |60-89|Stage 



                                         two| Decreased GFR 



                                         +-----------------------+---------------------+-------------------------+ 



                                         |30-59|Stage three|

 



                                         Stage three 



                                         +-----------------------+---------------------+-------------------------+ 



                                         |15-29|Stage four |

 



                                         Stage four 



                                         +-----------------------+---------------------+-------------------------+ 



                                         |<15 (or dialysis)|Stage five | Stage 



                                         five 



                                         +-----------------------+---------------------+-------------------------+ 



                                         *Each stage assumes the associated GFR level has been in effect for at least 



                                         three months.Stages 1 to 5, with or without kidney disease, indicate chronic

 



                                         kidney disease. 



                                         Notes: Determination of stages one and two (with eGFR >59mL/min/1.73 m2) 



                                         requires estimation of kidney damage for at least three months as defined by 



                                         structural or functional abnormalities of the kidney, manifested by either: 



                                         Pathological abnormalities or Markers of kidney damage (including abnormalities

 



                                         in the composition of the blood or urine or abnormalities in imaging tests). 









   



                 Performing Organization     Address         City/State/Zipcode     Phone Number

 

   



                 [facility]     CLIA: [id number], [address] [place] [address]     [phone]





                           [facility]   





* CBC WITH DIFFERENTIAL (2019  5:21 PM CDT)





    



              Component     Value        Ref Range     Performed At     Pathologist



                                         Signature

 

    



                 WBC             8.27            4.30 - 11.10     [facility] 



                           10*3/L                  [facility] 

 

    



                 RBC             4.34            3.93 - 5.25 10*6/L     [facility] 

 

    



                 HGB             12.4            11.6 - 15.0 g/dL     [facility] 



                                         [facility] 

 

    



                 HCT             38.6            35.7 - 45.2 %     [facility] 



                                         [facility] 

 

    



                 MCV             88.9            80.6 - 95.5 fL     [facility] 



                                         [facility] 

 

    



                 MCH             28.6            25.9 - 32.8 pg     [facility] 



                                         [facility] 

 

    



                 MCHC            32.1            31.6 - 35.1 g/dL     [facility] 



                                         [facility] 

 

    



                 RDW-SD          44.2            39.0 - 49.9 fL     [facility] 



                                         [facility] 

 

    



                 RDW-CV          13.5            12.0 - 15.5 %     [facility] 



                                         [facility] 

 

    



                 PLT             310             166 - 358 10*3/L     [facility] 



                                         [facility] 

 

    



                 MPV             10.9            9.5 - 12.9 fL     [facility] 



                                         [facility] 

 

    



                 NRBC/100 WBC     0.0             0.0 - 10.0 /100 WBCs     [facility] 



                                         [facility] 

 

    



                 NRBC x10^3      <0.01           10*3/L        [facility] 



                                         [facility] 

 

    



                 GRAN MAT (NEUT)     64.0            %               [facility] 



                           %                         [facility] 

 

    



                 IMM GRAN %      0.40            %               [facility] 



                                         [facility] 

 

    



                 LYMPH %         21.3            %               [facility] 

 

    



                 MONO %          9.2             %               [facility] 



                                         [facility] 

 

    



                 EOS %           4.0             %               [facility] 



                                         [facility] 

 

    



                 BASO %          1.1             %               [facility] 



                                         [facility] 

 

    



                 GRAN MAT        5.30            1.88 - 7.09 10*3/uL     [facility] 



                           x10^3(ANC)                [facility] 

 

    



                 IMM GRAN x10^3     0.03            0.00 - 0.06 10*3/uL     [facility] 



                                         [facility] 

 

    



                 LYMPH x10^3     1.76            1.32 - 3.29 10*3/uL     [facility] 



                                         [facility] 

 

    



                 MONO x10^3      0.76            0.33 - 0.92 10*3/uL     [facility] 



                                         [facility] 

 

    



                 EOS x10^3       0.33            0.03 - 0.39 10*3/uL     [facility] 



                                         [facility] 

 

    



                 BASO x10^3      0.09 (H)        0.01 - 0.07 10*3/uL     [facility] 



                                         [facility] 













                                         Specimen

 





                                         Blood - ARM, RIGHT









   



                 Performing Organization     Address         City/State/Zipcode     Phone Number

 

   



                 [facility]     CLIA: [id number], [address] [place] [address]     [phone]





                           [facility]   





documented in this encounter



Visit Diagnoses











                                         Diagnosis

 





                                         Restless leg syndrome - Primary



                                         Restless legs syndrome (RLS)

 





                                         Motion sickness, initial encounter

 





                                         Diarrhea, unspecified type

 





                                         Acquired hypothyroidism



                                         Unspecified hypothyroidism



documented in this encounter



Insurance







                                        Type



            Payer      Benefit     Subscriber ID     Effective     Phone      Address 



                           Plan /                    Dates   



                                         Group     

 

                                        Medicare Adv HMO [employer]     [employer]            [id number]       2019-P   



                     MANAGED MEDICARE     MEDICARE            resent   



                                         COMPLETE     









     



            Guarantor Name     Account     Relation to     Date of     Phone      Billing Address



                     Type                Patient             Birth  

 

     



            Marii Hewitt     Personal/F     Self       1940     [phone] [id number]               ([place])              [place] [address]



documented as of this encounter

## 2019-11-22 NOTE — XMS REPORT
Summary of Care

                             Created on: 2019



Marii Hewitt

External Reference #: [id number]

: 1940

Sex: Female



Demographics







                          Address                   [address] [place] [address]  [address]

 

                          Home Phone                [phone]

 

                          Preferred Language        English

 

                          Marital Status            

 

                          Moravian Affiliation     000

 

                          Race                      White

 

                          Ethnic Group              Non-





Author







                          Author                    [facility]

 

                          Organization              [facility]

 

                          Address                   Unknown

 

                          Phone                     Unavailable







Support







                Name            Relationship    Address         Phone

 

                    Eddie Hewitt     [employer]                [address] [place]  [address]                     [phone]







Care Team Providers







                    Care Team Member Name    Role                Phone

 

                    Ana Luisa Bautista MD    PCP                 [phone]







Reason for Visit

* 





 



                           Reason                    Comments

 

 



                                         Refill Request 









Encounter Details







                          Care Team                 Description



                     Date                Type                Department  

 

                                        



Ana Luisa Bautista MD



[address] [address] [phone] (Fax)                      Refill Request



                     2019          Refill              [facility]  



                                         [facility]  



                                         [address]  



                                         [id number] [phone]  







Allergies







                                        Comments



                 Active Allergy     Reactions       Severity        Noted Date 

 

                                        



Dry Mouth



                 Lisinopril      Other - See     High            2019 



                                         comments   



documented as of this encounter (statuses as of 2019)



Medications







                          End Date                  Status



              Medication     Sig          Dispensed     Refills      Start  



                                         Date  

 

                                                    Active



                 gabapentin 300 mg capsule     Take 600 mg      5                 



                           by mouth at               8  



                                         bedtime.     

 

                                                    Active



                 pantoprazole 40 mg EC     TK 1 T PO       3                 



                     tablet              ONCE D              8  

 

                                                    Active



                 metoprolol succinate XL     TK 1/2 T PO      3                 



                     25 mg 24 hr tablet     ONCE D              8  

 

                                                    Active



                 isosorbide mononitrate 60     TK 1 T PO QD      3                 



                           mg 24 hr tablet           8  

 

                                                    Active



                 furosemide 40 mg tablet     TK 1 T PO  D      3                 



                                         8  

 

                                                    Active



                 calcitriol 0.25 mcg     TK 2 CS PO QD      0                 



                           capsule                   8  

 

                                                    Active



                     aspirin (ASPIRIN LOW     Take 81 mg by       0   



                           DOSE) 81 mg EC tablet     mouth daily.     

 

                                                    Active



                     valsartan (DIOVAN) 40 mg     Take 40 mg by       0   



                           tablet                    mouth daily.     

 

                                                    Active



                     calcium carbonate/vitamin     Take  by            0   



                           D2 (CALCIUM-600-D ORAL)     mouth.     

 

                                                    Active



                     losartan potassium     Take  by            0   



                           (LOSARTAN                 mouth.     



                                         ORAL)Indications:      



                                         Essential hypertension      

 

                                                    Active



                     promethazine-dextromethor     Take 5 mL by        0   



                           tabares 6.25-15 mg/5 mL      mouth 4     



                           syrup                     (four) times     



                                         daily as     



                                         needed for     



                                         Cough.     

 

                                                    Active



              methylPREDNISolone     Take  by     21 Each      0              



                     (MEDROL, DESTINY,) 4 mg     mouth               9  



                           tabletsIndications:       SEE-INSTRUCTI     



                           Bronchitis                ONS. follow     



                                         package     



                                         directions     

 

                                                    Active



                     VITAMIN K2 ORAL     Take 1 tablet       0   



                                         by mouth     



                                         daily.     

 

                                                    Active



              losartan 25 mg     Take 1 tablet     30 tablet     1              



                     tabletIndications:     by mouth            9  



                           Essential hypertension     daily.     

 

                                                    Active



              levothyroxine 112 mcg     TAKE 1 TABLET     90 tablet     0              



                     tablet              BY MOUTH            9  



                                         EVERY MORNING     



                                         ON AN EMPTY     



                                         STOMACH WITH     



                                         WATER, AND     



                                         WAIT 1 HOUR     



                                         BEFORE EATING     



                                         OR TAKING     



                                         OTHER MEDS     

 

                                                    Active



              POTASSIUM CHLORIDE 10 mEq     TAKE 1 TABLET     30 tablet     0              



                     CR tablet           BY MOUTH            9  



                                         EVERY DAY     

 

                                                    Active



                     ergocalciferol, vitamin     Take 4,000          0   



                           D2, (VITAMIN D ORAL)      Units by     



                                         mouth daily.     

 

                                                    Active



              scopolamine transdermal 1     Apply 1 Patch     4 Patch      0              



                     mg over 3 days      to area(s)          9  



                           patchIndications: Motion     every 72     



                           sickness, initial         (seventy-two)     



                           encounter                 hours.     

 

                                                    Active



              imipramine 25 mg     Take 1 tablet     30 tablet     2              



                     tabletIndications:     by mouth            9  



                           Diarrhea, unspecified     daily.     



                                         type      

 

                                                    Active



              rOPINIRole 0.5 mg     TAKE 2       270 tablet     0              



                     tabletIndications:     TABLETS BY          9  



                           Restless leg syndrome     MOUTH 1 TO 3     



                                         HOURS BEFORE     



                                         BEDTIME X 7     



                                         DAYS. THEN     



                                         YOU CAN     



                                         INCREASE DOSE     



                                         BY 0.5 MG     



                                         UNTIL DESIRED     



                                         EFFECT OR YOU     



                                         REACH 3 MG     

 

                          2019                Discontinued



              rOPINIRole 0.5 mg     2 tablets 1-3     90 tablet     0              



                     tabletIndications:     hrs prior to        9  



                           Restless leg syndrome     bedtime x 7     



                                         days then if     



                                         needed can go     



                                         up weekly by     



                                         0.5 mg until     



                                         desired     



                                         effect or she     



                                         reaches 3 mg.     



documented as of this encounter (statuses as of 2019)



Active Problems





Not on filedocumented as of this encounter (statuses as of 2019)



Social History







                                        Date



                 Tobacco Use     Types           Packs/Day       Years Used 

 

                                         



                                         Never Smoker    

 

    



                                         Smokeless Tobacco: Never   



                                         Used   









                    Drinks/Week         oz/Week             Comments



                                         Alcohol Use   

 

                                                             



                                         Yes   









 



                           Sex Assigned at Birth     Date Recorded

 

 



                                         Not on file 









                                        Industry



                           Job Start Date            Occupation 

 

                                        Not on file



                           Not on file               Not on file 









                                        Travel End



                           Travel History            Travel Start 

 





                                         No recent travel history available.



documented as of this encounter



Last Filed Vital Signs

Not on filedocumented in this encounter



Plan of Treatment







                          Care Team                 Description



                     Date                Type                Specialty  

 

                                        



Ana Luisa Bautista MD



[address] [place] [address]



[place] [address]



[phone] (Fax)                       



                     2020          Office Visit        Family Medicine  









   



                 Health Maintenance     Due Date        Last Done       Comments

 

   



                           DTaP,Tdap,and Td Vaccines     1960  



                                         (1 - Tdap)   

 

   



                           Zoster Recombinant        1991  



                                         Vaccine (SHINGRIX) (1 of   



                                         2)   

 

   



                           Medicare Wellness Visit     2006  

 

   



                           Osteoporosis Screening     2006  

 

   



                           PNEUMOCOCCAL VACCINES 65+     2006  



                                         (1 of 2 - PCV13)   

 

   



                           INFLUENZA VACCINE (#1)     2019  



documented as of this encounter



Results

Not on filedocumented in this encounter



Visit Diagnoses











                                         Diagnosis

 





                                         Restless leg syndrome



                                         Restless legs syndrome (RLS)



documented in this encounter



Insurance







                                        Type



            Payer      Benefit     Subscriber ID     Effective     Phone      Address 



                           Plan /                    Dates   



                                         Group     

 

                                        Medicare Adv [facility]     [facility]            [id number]       2019-P   



                     MANAGED MEDICARE     MEDICARE            resent   



                                         COMPLETE     



documented as of this encounter

## 2019-11-22 NOTE — XMS REPORT
Summary of Care

                             Created on: 2019



Marii Hewitt

External Reference #: [id number]

: 1940

Sex: Female



Demographics







                          Address                   [address] [place] [address]  [address]

 

                          Home Phone                [phone]

 

                          Preferred Language        English

 

                          Marital Status            

 

                          Druze Affiliation     000

 

                          Race                      White

 

                          Ethnic Group              Non-





Author







                          Author                    [facility]

 

                          Organization              [facility]

 

                          Address                   Unknown

 

                          Phone                     Unavailable







Support







                Name            Relationship    Address         Phone

 

                    Eddie Hewitt     [employer]                [address] [place]  [address]                     [phone]







Care Team Providers







                    Care Team Member Name    Role                Phone

 

                    Ana Luisa Bautista MD    PCP                 [phone]







Reason for Referral

*  (Routine)





                          Referred By Contact       Referred To Contact



                 Status          Reason          Specialty       Diagnoses /  



                                         Procedures  

 

                                        



Ana Luisa Bautista MD



[address] [place] [address]



Phone: [phone]



Fax: [phone]                       



Destiny Cartwright



[address] [place] [id number]



Phone: [phone]



Fax: [phone]



                 New Request     Patient Requested     Cardiology      Diagnoses  



                           Specific Provider         Atherosclerosis of  



                                         native coronary  



                                         artery with angina  



                                         pectoris,  



                                         unspecified  



                                         whether native or  



                                         transplanted heart

  



                                         P  



                                         rocedures  



                                         CONSULT/REFERRAL  



                                         CARDIOLOGY  











Reason for Visit

* 





 



                           Reason                    Comments

 

 



                                         Referral/consult 









Encounter Details







                          Care Team                 Description



                     Date                Type                Department  

 

                                        



Ana Luisa Bautista MD



[address] [place] [address] [phone] (Fax)                      Referral/consult



                     2019          Telephone           [facility]  



                                         [facility], [address] [address] [phone]  







Allergies







                                        Comments



                 Active Allergy     Reactions       Severity        Noted Date 

 

                                        



Dry Mouth



                 Lisinopril      Other - See     High            2019 



                                         comments   



documented as of this encounter (statuses as of 2019)



Medications







                          End Date                  Status



              Medication     Sig          Dispensed     Refills      Start  



                                         Date  

 

                                                    Active



                 gabapentin 300 mg capsule     Take 600 mg      5                 



                           by mouth at               8  



                                         bedtime.     

 

                                                    Active



                 pantoprazole 40 mg EC     TK 1 T PO       3                 



                     tablet              ONCE D              8  

 

                                                    Active



                 metoprolol succinate XL     TK 1/2 T PO      3                 



                     25 mg 24 hr tablet     ONCE D              8  

 

                                                    Active



                 isosorbide mononitrate 60     TK 1 T PO QD      3                 



                           mg 24 hr tablet           8  

 

                                                    Active



                 furosemide 40 mg tablet     TK 1 T PO  D      3                 



                                         8  

 

                                                    Active



                 calcitriol 0.25 mcg     TK 2 CS PO QD      0                 



                           capsule                   8  

 

                                                    Active



                     aspirin (ASPIRIN LOW     Take 81 mg by       0   



                           DOSE) 81 mg EC tablet     mouth daily.     

 

                                                    Active



                     valsartan (DIOVAN) 40 mg     Take 40 mg by       0   



                           tablet                    mouth daily.     

 

                                                    Active



                     calcium carbonate/vitamin     Take  by            0   



                           D2 (CALCIUM-600-D ORAL)     mouth.     

 

                                                    Active



                     losartan potassium     Take  by            0   



                           (LOSARTAN                 mouth.     



                                         ORAL)Indications:      



                                         Essential hypertension      

 

                                                    Active



                     promethazine-dextromethor     Take 5 mL by        0   



                           tabares 6.25-15 mg/5 mL      mouth 4     



                           syrup                     (four) times     



                                         daily as     



                                         needed for     



                                         Cough.     

 

                                                    Active



              methylPREDNISolone     Take  by     21 Each      0              



                     (MEDROL, DESTINY,) 4 mg     mouth               9  



                           tabletsIndications:       SEE-INSTRUCTI     



                           Bronchitis                ONS. follow     



                                         package     



                                         directions     

 

                                                    Active



              rOPINIRole 0.5 mg     Take 1 tablet     90 tablet     1              



                     tabletIndications:     by mouth at         9  



                           Restless leg syndrome     bedtime.     

 

                                                    Active



                     VITAMIN K2 ORAL     Take 1 tablet       0   



                                         by mouth     



                                         daily.     

 

                                                    Active



              levothyroxine 112 mcg     TAKE 1 TABLET     60 tablet     0              



                     tablet              BY MOUTH            9  



                                         EVERY MORNING     



                                         ON AN EMPTY     



                                         STOMACH WITH     



                                         WATER, AND     



                                         WAIT 1 HOUR     



                                         BEFORE EATING     



                                         OR TAKING     



                                         OTHER MEDS     

 

                                                    Active



              losartan 25 mg     Take 1 tablet     30 tablet     1              



                     tabletIndications:     by mouth            9  



                           Essential hypertension     daily.     

 

                                                    Active



              POTASSIUM CHLORIDE 10 mEq     TAKE 1 TABLET     30 tablet     0              



                     CR tablet           BY MOUTH            9  



                                         EVERY DAY     



documented as of this encounter (statuses as of 2019)



Active Problems





Not on filedocumented as of this encounter (statuses as of 2019)



Social History







                                        Date



                 Tobacco Use     Types           Packs/Day       Years Used 

 

                                         



                                         Never Smoker    

 

    



                                         Smokeless Tobacco: Never   



                                         Used   









                    Drinks/Week         oz/Week             Comments



                                         Alcohol Use   

 

                                                             



                                         Yes   









 



                           Sex Assigned at Birth     Date Recorded

 

 



                                         Not on file 









                                        Industry



                           Job Start Date            Occupation 

 

                                        Not on file



                           Not on file               Not on file 









                                        Travel End



                           Travel History            Travel Start 

 





                                         No recent travel history available.



documented as of this encounter



Last Filed Vital Signs

Not on filedocumented in this encounter



Plan of Treatment







                          Care Team                 Description



                     Date                Type                Specialty  

 

                                        



Ana Luisa Bautista MD



[address] [address]



[phone] ([phone]                       



                     2019          Office Visit        Family Medicine  









   



                 Health Maintenance     Due Date        Last Done       Comments

 

   



                           DTaP,Tdap,and Td Vaccines     1960  



                                         (1 - Tdap)   

 

   



                           Zoster Recombinant        1991  



                                         Vaccine (SHINGRIX) (1 of   



                                         2)   

 

   



                           Medicare Wellness Visit     2006  

 

   



                           Osteoporosis Screening     2006  

 

   



                           PNEUMOCOCCAL VACCINES 65+     2006  



                                         (1 of 2 - PCV13)   

 

   



                           INFLUENZA VACCINE         2019  



documented as of this encounter



Results

Not on filedocumented in this encounter



Visit Diagnoses











                                         Diagnosis

 





                                         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

 native or



                                         transplanted heart - Primary



documented in this encounter



Insurance







                                        Type



            Payer      Benefit     Subscriber ID     Effective     Phone      Address 



                           Plan /                    Dates   



                                         Group     

 

                                        Medicare Adv [facility] -     [facility]            [id number]       2019-P   



                     MANAGED MEDICARE     MEDICARE            resent   



                                         COMPLETE     



documented as of this encounter

## 2019-11-22 NOTE — XMS REPORT
Summary of Care

                             Created on: 2019



Marii Hewitt

External Reference #: [id number]

: 1940

Sex: Female



Demographics







                          Address                   [address] [place] [address]  [address]

 

                          Home Phone                [phone]

 

                          Preferred Language        English

 

                          Marital Status            

 

                          Yazidism Affiliation     000

 

                          Race                      White

 

                          Ethnic Group              Non-





Author







                          Author                    [facility]

 

                          Organization              [facility]

 

                          Address                   Unknown

 

                          Phone                     Unavailable







Support







                Name            Relationship    Address         Phone

 

                    Eddie Hewitt     [employer]                [address] [place]  [address]                     [phone]







Care Team Providers







                    Care Team Member Name    Role                Phone

 

                    Ana Luisa Bautista MD    PCP                 [phone]







Reason for Visit

* 





 



                           Reason                    Comments

 

 



                                         Referral/consult 









Encounter Details







                          Care Team                 Description



                     Date                Type                Department  

 

                                        



Ana Luisa Bautista MD



[address] [address] [phone] (Fax)                      Referral/consult



                     2019          Telephone           [facility]  



                                         [facility]  



                                         [address]  



                                         [place]  



                                         [address] [phone]  







Allergies







                                        Comments



                 Active Allergy     Reactions       Severity        Noted Date 

 

                                        



Dry Mouth



                 Lisinopril      Other - See     High            2019 



                                         comments   



documented as of this encounter (statuses as of 2019)



Medications







                          End Date                  Status



              Medication     Sig          Dispensed     Refills      Start  



                                         Date  

 

                                                    Active



                 gabapentin 300 mg capsule     Take 600 mg      5                 



                           by mouth at               8  



                                         bedtime.     

 

                                                    Active



                 pantoprazole 40 mg EC     TK 1 T PO       3                 



                     tablet              ONCE D              8  

 

                                                    Active



                 metoprolol succinate XL     TK 1/2 T PO      3                 



                     25 mg 24 hr tablet     ONCE D              8  

 

                                                    Active



                 isosorbide mononitrate 60     TK 1 T PO QD      3                 



                           mg 24 hr tablet           8  

 

                                                    Active



                 furosemide 40 mg tablet     TK 1 T PO  D      3                 



                                         8  

 

                                                    Active



                 calcitriol 0.25 mcg     TK 2 CS PO QD      0                 



                           capsule                   8  

 

                                                    Active



                     aspirin (ASPIRIN LOW     Take 81 mg by       0   



                           DOSE) 81 mg EC tablet     mouth daily.     

 

                                                    Active



                     valsartan (DIOVAN) 40 mg     Take 40 mg by       0   



                           tablet                    mouth daily.     

 

                                                    Active



                     calcium carbonate/vitamin     Take  by            0   



                           D2 (CALCIUM-600-D ORAL)     mouth.     

 

                                                    Active



                     losartan potassium     Take  by            0   



                           (LOSARTAN                 mouth.     



                                         ORAL)Indications:      



                                         Essential hypertension      

 

                                                    Active



                     promethazine-dextromethor     Take 5 mL by        0   



                           tabares 6.25-15 mg/5 mL      mouth 4     



                           syrup                     (four) times     



                                         daily as     



                                         needed for     



                                         Cough.     

 

                                                    Active



              methylPREDNISolone     Take  by     21 Each      0              



                     (MEDROL, DESTINY,) 4 mg     mouth               9  



                           tabletsIndications:       SEE-INSTRUCTI     



                           Bronchitis                ONS. follow     



                                         package     



                                         directions     

 

                                                    Active



                     VITAMIN K2 ORAL     Take 1 tablet       0   



                                         by mouth     



                                         daily.     

 

                                                    Active



              losartan 25 mg     Take 1 tablet     30 tablet     1              



                     tabletIndications:     by mouth            9  



                           Essential hypertension     daily.     

 

                                                    Active



              levothyroxine 112 mcg     TAKE 1 TABLET     90 tablet     0              



                     tablet              BY MOUTH            9  



                                         EVERY MORNING     



                                         ON AN EMPTY     



                                         STOMACH WITH     



                                         WATER, AND     



                                         WAIT 1 HOUR     



                                         BEFORE EATING     



                                         OR TAKING     



                                         OTHER MEDS     

 

                                                    Active



              POTASSIUM CHLORIDE 10 mEq     TAKE 1 TABLET     30 tablet     0              



                     CR tablet           BY MOUTH            9  



                                         EVERY DAY     

 

                                                    Active



                     ergocalciferol, vitamin     Take 4,000          0   



                           D2, (VITAMIN D ORAL)      Units by     



                                         mouth daily.     

 

                                                    Active



              scopolamine transdermal 1     Apply 1 Patch     4 Patch      0              



                     mg over 3 days      to area(s)          9  



                           patchIndications: Motion     every 72     



                           sickness, initial         (seventy-two)     



                           encounter                 hours.     

 

                                                    Active



              imipramine 25 mg     Take 1 tablet     30 tablet     2              



                     tabletIndications:     by mouth            9  



                           Diarrhea, unspecified     daily.     



                                         type      

 

                                                    Active



              rOPINIRole 0.5 mg     TAKE 2       270 tablet     0              



                     tabletIndications:     TABLETS BY          9  



                           Restless leg syndrome     MOUTH 1 TO 3     



                                         HOURS BEFORE     



                                         BEDTIME X 7     



                                         DAYS. THEN     



                                         YOU CAN     



                                         INCREASE DOSE     



                                         BY 0.5 MG     



                                         UNTIL DESIRED     



                                         EFFECT OR YOU     



                                         REACH 3 MG     



documented as of this encounter (statuses as of 2019)



Active Problems





Not on filedocumented as of this encounter (statuses as of 2019)



Social History







                                        Date



                 Tobacco Use     Types           Packs/Day       Years Used 

 

                                         



                                         Never Smoker    

 

    



                                         Smokeless Tobacco: Never   



                                         Used   









                    Drinks/Week         oz/Week             Comments



                                         Alcohol Use   

 

                                                             



                                         Yes   









 



                           Sex Assigned at Birth     Date Recorded

 

 



                                         Not on file 









                                        Industry



                           Job Start Date            Occupation 

 

                                        Not on file



                           Not on file               Not on file 









                                        Travel End



                           Travel History            Travel Start 

 





                                         No recent travel history available.



documented as of this encounter



Last Filed Vital Signs

Not on filedocumented in this encounter



Plan of Treatment







                          Care Team                 Description



                     Date                Type                Specialty  

 

                                        



Ana Luisa Bautista MD



[address] [address]



[phone] (Fax)                       



                     2020          Office Visit        Family Medicine  









   



                 Health Maintenance     Due Date        Last Done       Comments

 

   



                           DTaP,Tdap,and Td Vaccines     1960  



                                         (1 - Tdap)   

 

   



                           Zoster Recombinant        1991  



                                         Vaccine (SHINGRIX) (1 of   



                                         2)   

 

   



                           Medicare Wellness Visit     2006  

 

   



                           Osteoporosis Screening     2006  

 

   



                           PNEUMOCOCCAL VACCINES 65+     2006  



                                         (1 of 2 - PCV13)   

 

   



                           INFLUENZA VACCINE (#1)     2019  



documented as of this encounter



Results

Not on filedocumented in this encounter



Insurance







                                        Type



            Payer      Benefit     Subscriber ID     Effective     Phone      Address 



                           Plan /                    Dates   



                                         Group     

 

                                        Medicare Adv O



                 [facility]     [employer]            [id number]       2019-P   



                     MANAGED MEDICARE     MEDICARE            resent   



                                         COMPLETE     



documented as of this encounter

## 2019-11-22 NOTE — CONSULTATION
DATE OF CONSULTATION:  11/22/2019

 

Cardiology Consultation 

 

ADDITIONAL REFERRING PHYSICIAN:  Derrick Haynes MD.

 

CONSULTING:  Juliano Stewart MD, Interventional Cardiology.

 

REASON FOR CONSULTATION:  Heart failure.

 

HISTORY OF PRESENT ILLNESS:  Ms. Hewitt is a 79-year-old woman with morbid obesity,

hypertension, dyslipidemia, coronary artery disease, status post aortic coronary bypass

several years ago at [facility], history of CKD and chronic heart

failure, unspecified, who presents with gradually worsening lower extremity edema,

weight gain, and orthopnea.  Symptoms have continued to worsen despite of the patient

doubling up furosemide to 40 mg b.i.d. she decided to seek further attention in the ER.

She has been initiated on IV Lasix with improvement in symptoms. She currently has no

complaint.  When she lies flat, she starts noticing shortness of breath and chest

discomfort that is relieved when sitting upright.  She denies any exertional chest

discomfort.  She has no other complaints at this time.  Currently symptom free. 

 

REVIEW OF SYSTEMS:

A 12-system review is negative except for as noted above.

 

PAST MEDICAL HISTORY:  Remarkable for hypertension, chronic heart failure, unspecified

morbid obesity, coronary artery disease and chronic kidney disease. 

 

SOCIAL HISTORY:  Denies smoking, alcohol, or drugs.

 

FAMILY HISTORY:  Noncontributory.

 

PHYSICAL EXAMINATION:

VITAL SIGNS:  Temperature 96.4, heart rate 57, blood pressure 122/55, respiratory rate

16, O2 saturation 97%, BMI 36.8. 

GENERAL:  In no acute distress alert. 

NECK:  JVD elevated to lower half of that with elevation 60 degrees of head of bed.  No

carotid bruits. 

CHEST:  Clear to auscultation. 

CARDIOVASCULAR:  Regular rate and rhythm.  Normal S1, S2.  No S3 or S4.  Systolic

ejection murmur to the left lower sternal border. 

ABDOMEN:  Soft, nontender.  Bowel sounds positive. 

EXTREMITIES:  With trace edema.  Varicose vein and telangiectasias with skin

discoloration to the malleolus bilaterally.  Trace edema. 

CARDIOVASCULAR MEDICATIONS:  Reviewed.  Furosemide 40 mg IV daily will be up titrated to

b.i.d. metoprolol succinate 25 mg daily, KCl 10 mEq daily, aspirin 81 mg daily valsartan

40 mg at bedtime. 

 

STUDIES:  Reviewed remarkable for creatinine 1.1, potassium 4, bicarbonate 28, white

blood cells 8.4, and hematocrit 38. 

 

ASSESSMENT:  

1. Acute on chronic heart failure, unspecified.

2. Coronary artery disease with history of bypass.

3. Hypertension.

4. Morbid obesity.

5. Venous insufficiency.

 

RECOMMENDATIONS:  

1. Up titrate Lasix to 40 mg IV b.i.d., maintain a low-sodium diet.  Continue

metoprolol, losartan as well as aspirin. 

2. Obtain echocardiogram.

3. Maintain on telemetry overnight. 

 

Thank you for the opportunity to participate in the care of this patient.  Please feel

free to call with any questions. 

 

 

 

 

______________________________

Juliano Stewart MD

 

AFV/MODL

D:  11/22/2019 19:14:04

T:  11/22/2019 20:25:31

Job #:  [id number]

## 2019-11-22 NOTE — XMS REPORT
Summary of Care

                             Created on: 2019



Marii Hewitt

External Reference #: [id number]

: 1940

Sex: Female



Demographics







                          Address                   [address] [place] [address]  [address]

 

                          Home Phone                [phone]

 

                          Preferred Language        English

 

                          Marital Status            

 

                          Advent Affiliation     000

 

                          Race                      White

 

                          Ethnic Group              Non-





Author







                          Author                    [facility]

 

                          Organization              [facility]

 

                          Address                   Unknown

 

                          Phone                     Unavailable







Support







                Name            Relationship    Address         Phone

 

                    Eddie Hewitt     [employer]                [address] [place]  [address]                     [phone]







Care Team Providers







                    Care Team Member Name    Role                Phone

 

                    Ana Luisa Bautista MD    PCP                 [phone]







Reason for Referral

*  (Routine)





                          Referred By Contact       Referred To Contact



                 Status          Reason          Specialty       Diagnoses /  



                                         Procedures  

 

                                        



Ana Luisa Bautista MD



[address]



[address]



[place] [address]



Phone: [phone]



Fax: [phone]                       







                     New Request         Orthopedic          Diagnoses  



                           Surgery                   Chronic knee pain,  



                                         unspecified  



                                         laterality

  



                                         P  



                                         rocedures  



                                         CONSULT/REFERRAL  



                                         ORTHOPAEDIC  



                                         SURGERY  











Reason for Visit

* 





 



                           Reason                    Comments

 

 



                                         Referral/consult 









Encounter Details







                          Care Team                 Description



                     Date                Type                Department  

 

                                        



Ana Luisa Bautista MD



[address]



[facility] [address]



[place] [address] [phone] (Fax)                      Referral/consult



                     2019          Telephone           [facility]  



                                         [facility]  



                                         [facility]  



                                         [address]  



                                         [phone]  







Allergies







                                        Comments



                 Active Allergy     Reactions       Severity        Noted Date 

 

                                        



Dry Mouth



                 Lisinopril      Other - See     High            2019 



                                         comments   



documented as of this encounter (statuses as of 2019)



Medications







                          End Date                  Status



              Medication     Sig          Dispensed     Refills      Start  



                                         Date  

 

                                                    Active



                 gabapentin 300 mg capsule     Take 600 mg      5                 



                           by mouth at               8  



                                         bedtime.     

 

                                                    Active



                 pantoprazole 40 mg EC     TK 1 T PO       3                 



                     tablet              ONCE D              8  

 

                                                    Active



                 metoprolol succinate XL     TK 1/2 T PO      3                 



                     25 mg 24 hr tablet     ONCE D              8  

 

                                                    Active



                 isosorbide mononitrate 60     TK 1 T PO QD      3                 



                           mg 24 hr tablet           8  

 

                                                    Active



                 furosemide 40 mg tablet     TK 1 T PO  D      3                 



                                         8  

 

                                                    Active



                 calcitriol 0.25 mcg     TK 2 CS PO QD      0                 



                           capsule                   8  

 

                                                    Active



                     aspirin (ASPIRIN LOW     Take 81 mg by       0   



                           DOSE) 81 mg EC tablet     mouth daily.     

 

                                                    Active



                     valsartan (DIOVAN) 40 mg     Take 40 mg by       0   



                           tablet                    mouth daily.     

 

                                                    Active



                     calcium carbonate/vitamin     Take  by            0   



                           D2 (CALCIUM-600-D ORAL)     mouth.     

 

                                                    Active



                     losartan potassium     Take  by            0   



                           (LOSARTAN                 mouth.     



                                         ORAL)Indications:      



                                         Essential hypertension      

 

                                                    Active



                     promethazine-dextromethor     Take 5 mL by        0   



                           tabares 6.25-15 mg/5 mL      mouth 4     



                           syrup                     (four) times     



                                         daily as     



                                         needed for     



                                         Cough.     

 

                                                    Active



              methylPREDNISolone     Take  by     21 Each      0              



                     (MEDROL, DESTINY,) 4 mg     mouth               9  



                           tabletsIndications:       SEE-INSTRUCTI     



                           Bronchitis                ONS. follow     



                                         package     



                                         directions     

 

                                                    Active



                     VITAMIN K2 ORAL     Take 1 tablet       0   



                                         by mouth     



                                         daily.     

 

                                                    Active



              losartan 25 mg     Take 1 tablet     30 tablet     1              



                     tabletIndications:     by mouth            9  



                           Essential hypertension     daily.     

 

                                                    Active



              levothyroxine 112 mcg     TAKE 1 TABLET     90 tablet     0              



                     tablet              BY MOUTH            9  



                                         EVERY MORNING     



                                         ON AN EMPTY     



                                         STOMACH WITH     



                                         WATER, AND     



                                         WAIT 1 HOUR     



                                         BEFORE EATING     



                                         OR TAKING     



                                         OTHER MEDS     

 

                                                    Active



              POTASSIUM CHLORIDE 10 mEq     TAKE 1 TABLET     30 tablet     0              



                     CR tablet           BY MOUTH            9  



                                         EVERY DAY     

 

                                                    Active



                     ergocalciferol, vitamin     Take 4,000          0   



                           D2, (VITAMIN D ORAL)      Units by     



                                         mouth daily.     

 

                                                    Active



              scopolamine transdermal 1     Apply 1 Patch     4 Patch      0              



                     mg over 3 days      to area(s)          9  



                           patchIndications: Motion     every 72     



                           sickness, initial         (seventy-two)     



                           encounter                 hours.     

 

                                                    Active



              imipramine 25 mg     Take 1 tablet     30 tablet     2              



                     tabletIndications:     by mouth            9  



                           Diarrhea, unspecified     daily.     



                                         type      

 

                                                    Active



              rOPINIRole 0.5 mg     TAKE 2       270 tablet     0              



                     tabletIndications:     TABLETS BY          9  



                           Restless leg syndrome     MOUTH 1 TO 3     



                                         HOURS BEFORE     



                                         BEDTIME X 7     



                                         DAYS. THEN     



                                         YOU CAN     



                                         INCREASE DOSE     



                                         BY 0.5 MG     



                                         UNTIL DESIRED     



                                         EFFECT OR YOU     



                                         REACH 3 MG     



documented as of this encounter (statuses as of 2019)



Active Problems





Not on filedocumented as of this encounter (statuses as of 2019)



Social History







                                        Date



                 Tobacco Use     Types           Packs/Day       Years Used 

 

                                         



                                         Never Smoker    

 

    



                                         Smokeless Tobacco: Never   



                                         Used   









                    Drinks/Week         oz/Week             Comments



                                         Alcohol Use   

 

                                                             



                                         Yes   









 



                           Sex Assigned at Birth     Date Recorded

 

 



                                         Not on file 









                                        Industry



                           Job Start Date            Occupation 

 

                                        Not on file



                           Not on file               Not on file 









                                        Travel End



                           Travel History            Travel Start 

 





                                         No recent travel history available.



documented as of this encounter



Last Filed Vital Signs

Not on filedocumented in this encounter



Plan of Treatment







                          Care Team                 Description



                     Date                Type                Specialty  

 

                                        



Ana Luisa Bautista MD



[address] [facility] [address]



[place] [address]



[phone] (Fax)                       



                     2020          Office Visit        Family Medicine  









   



                 Health Maintenance     Due Date        Last Done       Comments

 

   



                           DTaP,Tdap,and Td Vaccines     1960  



                                         (1 - Tdap)   

 

   



                           Zoster Recombinant        1991  



                                         Vaccine (SHINGRIX) (1 of   



                                         2)   

 

   



                           Medicare Wellness Visit     2006  

 

   



                           Osteoporosis Screening     2006  

 

   



                           PNEUMOCOCCAL VACCINES 65+     2006  



                                         (1 of 2 - PCV13)   

 

   



                           INFLUENZA VACCINE (#1)     2019  



documented as of this encounter



Results

Not on filedocumented in this encounter



Visit Diagnoses











                                         Diagnosis

 





                                         Chronic knee pain, unspecified laterality - Primary



documented in this encounter



Insurance







                                        Type



            Payer      Benefit     Subscriber ID     Effective     Phone      Address 



                           Plan /                    Dates   



                                         Group     

 

                                        Medicare Adv O



                 [facility]     [employer]            [id number]       2019-P   



                     MANAGED MEDICARE     MEDICARE            resent   



                                         COMPLETE     



documented as of this encounter

## 2019-11-22 NOTE — XMS REPORT
Summary of Care

                             Created on: 2019



Marii Hewitt

External Reference #: [id number]

: 1940

Sex: Female



Demographics







                          Address                   [address] [place] [address]  [address]

 

                          Home Phone                [phone]

 

                          Preferred Language        English

 

                          Marital Status            

 

                          Worship Affiliation     000

 

                          Race                      White

 

                          Ethnic Group              Non-





Author







                          Author                    [facility]

 

                          Organization              [facility]

 

                          Address                   Unknown

 

                          Phone                     Unavailable







Support







                Name            Relationship    Address         Phone

 

                    Eddie Hewitt     [employer]                [address] [place]  [address]                     [phone]







Care Team Providers







                    Care Team Member Name    Role                Phone

 

                    Ana Luisa Bautista MD    PCP                 [phone]







Reason for Visit

* 





 



                           Reason                    Comments

 

 



                                         Pain 









Encounter Details







                          Care Team                 Description



                     Date                Type                Department  

 

                                        



Ana Luisa Bautista MD



[address] [facility] [address]



[place] [address]



[phone] (Fax)                      Restless leg syndrome (Primary Dx); 

Motion sickness, initial encounter; 

Diarrhea, unspecified type; 

Acquired hypothyroidism



                     2019          Office Visit        [facility]  



                                         [facility]  



                                         [facility]  



                                         [address]  



                                         [place]  



                                         [address] [phone]  







Allergies







                                        Comments



                 Active Allergy     Reactions       Severity        Noted Date 

 

                                        



Dry Mouth



                 Lisinopril      Other - See     High            2019 



                                         comments   



documented as of this encounter (statuses as of 2019)



Medications







                          End Date                  Status



              Medication     Sig          Dispensed     Refills      Start  



                                         Date  

 

                                                    Active



                 gabapentin 300 mg capsule     Take 600 mg      5                 



                           by mouth at               8  



                                         bedtime.     

 

                                                    Active



                 pantoprazole 40 mg EC     TK 1 T PO       3                 



                     tablet              ONCE D              8  

 

                                                    Active



                 metoprolol succinate XL     TK 1/2 T PO      3                 



                     25 mg 24 hr tablet     ONCE D              8  

 

                                                    Active



                 isosorbide mononitrate 60     TK 1 T PO QD      3                 



                           mg 24 hr tablet           8  

 

                                                    Active



                 furosemide 40 mg tablet     TK 1 T PO  D      3                 



                                         8  

 

                                                    Active



                 calcitriol 0.25 mcg     TK 2 CS PO QD      0                 



                           capsule                   8  

 

                                                    Active



                     aspirin (ASPIRIN LOW     Take 81 mg by       0   



                           DOSE) 81 mg EC tablet     mouth daily.     

 

                                                    Active



                     valsartan (DIOVAN) 40 mg     Take 40 mg by       0   



                           tablet                    mouth daily.     

 

                                                    Active



                     calcium carbonate/vitamin     Take  by            0   



                           D2 (CALCIUM-600-D ORAL)     mouth.     

 

                                                    Active



                     losartan potassium     Take  by            0   



                           (LOSARTAN                 mouth.     



                                         ORAL)Indications:      



                                         Essential hypertension      

 

                                                    Active



                     promethazine-dextromethor     Take 5 mL by        0   



                           tabares 6.25-15 mg/5 mL      mouth 4     



                           syrup                     (four) times     



                                         daily as     



                                         needed for     



                                         Cough.     

 

                                                    Active



              methylPREDNISolone     Take  by     21 Each      0              



                     (MEDROL, DESTINY,) 4 mg     mouth               9  



                           tabletsIndications:       SEE-INSTRUCTI     



                           Bronchitis                ONS. follow     



                                         package     



                                         directions     

 

                                                    Active



                     VITAMIN K2 ORAL     Take 1 tablet       0   



                                         by mouth     



                                         daily.     

 

                                                    Active



              losartan 25 mg     Take 1 tablet     30 tablet     1              



                     tabletIndications:     by mouth            9  



                           Essential hypertension     daily.     

 

                                                    Active



              levothyroxine 112 mcg     TAKE 1 TABLET     90 tablet     0              



                     tablet              BY MOUTH            9  



                                         EVERY MORNING     



                                         ON AN EMPTY     



                                         STOMACH WITH     



                                         WATER, AND     



                                         WAIT 1 HOUR     



                                         BEFORE EATING     



                                         OR TAKING     



                                         OTHER MEDS     

 

                                                    Active



              POTASSIUM CHLORIDE 10 mEq     TAKE 1 TABLET     30 tablet     0              



                     CR tablet           BY MOUTH            9  



                                         EVERY DAY     

 

                                                    Active



                     ergocalciferol, vitamin     Take 4,000          0   



                           D2, (VITAMIN D ORAL)      Units by     



                                         mouth daily.     

 

                                                    Active



              scopolamine transdermal 1     Apply 1 Patch     4 Patch      0              



                     mg over 3 days      to area(s)          9  



                           patchIndications: Motion     every 72     



                           sickness, initial         (seventy-two)     



                           encounter                 hours.     

 

                                                    Active



              rOPINIRole 0.5 mg     2 tablets 1-3     90 tablet     0              



                     tabletIndications:     hrs prior to        9  



                           Restless leg syndrome     bedtime x 7     



                                         days then if     



                                         needed can go     



                                         up weekly by     



                                         0.5 mg until     



                                         desired     



                                         effect or she     



                                         reaches 3 mg.     

 

                                                    Active



              imipramine 25 mg     Take 1 tablet     30 tablet     2              



                     tabletIndications:     by mouth            9  



                           Diarrhea, unspecified     daily.     



                                         type      

 

                          2019                Discontinued



              rOPINIRole 0.5 mg     Take 1 tablet     90 tablet     1              



                     tabletIndications:     by mouth at         9  



                           Restless leg syndrome     bedtime.     



documented as of this encounter (statuses as of 2019)



Active Problems





Not on filedocumented as of this encounter (statuses as of 2019)



Social History







                                        Date



                 Tobacco Use     Types           Packs/Day       Years Used 

 

                                         



                                         Never Smoker    

 

    



                                         Smokeless Tobacco: Never   



                                         Used   









                    Drinks/Week         oz/Week             Comments



                                         Alcohol Use   

 

                                                             



                                         Yes   









 



                           Sex Assigned at Birth     Date Recorded

 

 



                                         Not on file 









                                        Industry



                           Job Start Date            Occupation 

 

                                        Not on file



                           Not on file               Not on file 









                                        Travel End



                           Travel History            Travel Start 

 





                                         No recent travel history available.



documented as of this encounter



Last Filed Vital Signs







                    Reading             Time Taken          Comments



                                         Vital Sign   

 

                    134/82              2019  3:42 PM CDT     



                                         Blood Pressure   

 

                    64                  2019  3:42 PM CDT     



                                         Pulse   

 

                    -                   -                    



                                         Temperature   

 

                    -                   -                    



                                         Respiratory Rate   

 

                    99%                 2019  3:42 PM CDT     



                                         Oxygen Saturation   

 

                    -                   -                    



                                         Inhaled Oxygen   



                                         Concentration   

 

                    103.1 kg (227 lb 3.2 oz)    2019  3:42 PM CDT     



                                         Weight   

 

                    -                   -                    



                                         Height   

 

                    42.58               2019  2:02 PM CDT     



                                         Body Mass Index   



documented in this encounter



Progress Notes

* Ana Luisa Bautista MD - 2019  3:00 PM CDT



Formatting of this note might be different from the original.

Cc: 

Chief Complaint 

Patient presents with 

 Pain 



Marii Hewitt is a 79 year old female.

HPI 

She is going on a cruise in October and is requesting motion sickness pills.

Patient states that for years she has had issues with on and off diarrhea .  But
lately she has had issues with random unprovoked episodes of diarrhea that she 
has no control over.  

In both ears she has had a constant swooshing sound for 2 weeks.  Patient states
Dr. Short stopped her calcitriol.  she is now taking Calcium 1800 mg and vitamin D 
4000 iu.  Patient states that her calcium level is low.  Patient states that ha
ving bone and muscle achiness, slight better when she re-started her calcium sup
plement.

Check how much parathyroid she has left.  

Neuropathy pain comes and goes in degrees.  ropiranole will help.  

Patient states she has a tightening of the throat. Makes it hard to move her jaw
.  She denies any shortness of breath.

She has itching under her underarm.  Patient states that has bumps and itching i
n groin. Patient states on and off.  

Patient saw Dr. Cartwright.  She is setting her up for a Nuclear stress test and Ec
hocardiogram.  Stress test will be done after she returns from her cruise.  



Allergies

Marii is allergic to lisinopril.



Medications

Outpatient Medications Prior to Visit 

Medication Sig Dispense Refill 

 ergocalciferol, vitamin D2, (VITAMIN D ORAL) Take 4,000 Units by mouth daily
.   

 POTASSIUM CHLORIDE 10 mEq CR tablet TAKE 1 TABLET BY MOUTH EVERY DAY 30 tabl
et 0 

 levothyroxine 112 mcg tablet TAKE 1 TABLET BY MOUTH EVERY MORNING ON AN EMPT
Y STOMACH WITH WATER, AND WAIT 1 HOUR BEFORE EATING OR TAKING OTHER MEDS 90 tabl
et 0 

 losartan 25 mg tablet Take 1 tablet by mouth daily. 30 tablet 1 

 VITAMIN K2 ORAL Take 1 tablet by mouth daily.   

 rOPINIRole 0.5 mg tablet Take 1 tablet by mouth at bedtime. 90 tablet 1 

 aspirin (ASPIRIN LOW DOSE) 81 mg EC tablet Take 81 mg by mouth daily.   

 furosemide 40 mg tablet TK 1 T PO  D  3 

 gabapentin 300 mg capsule Take 600 mg by mouth at bedtime.  5 

 isosorbide mononitrate 60 mg 24 hr tablet TK 1 T PO QD  3 

 metoprolol succinate XL 25 mg 24 hr tablet TK 1/2 T PO ONCE D  3 

 pantoprazole 40 mg EC tablet TK 1 T PO ONCE D  3 

 methylPREDNISolone (MEDROL, DESTINY,) 4 mg tablets Take  by mouth SEE-INSTRUCTIO
NS. follow package directions 21 Each 0 

 promethazine-dextromethorphan 6.25-15 mg/5 mL syrup Take 5 mL by mouth 4 (fo
ur) times daily as needed for Cough.   

 losartan potassium (LOSARTAN ORAL) Take  by mouth.   

 calcitriol 0.25 mcg capsule TK 2 CS PO QD  0 

 calcium carbonate/vitamin D2 (CALCIUM-600-D ORAL) Take  by mouth.   

 valsartan (DIOVAN) 40 mg tablet Take 40 mg by mouth daily.   



No facility-administered medications prior to visit.  



Current Outpatient Medications: 

  ergocalciferol, vitamin D2, (VITAMIN D ORAL), Take 4,000 Units by mouth jasson gonzales, Disp: , Rfl: 

  scopolamine transdermal 1 mg over 3 days patch, Apply 1 Patch to area(s) ev
jt 72 (seventy-two) hours., Disp: 4 Patch, Rfl: 0

  POTASSIUM CHLORIDE 10 mEq CR tablet, TAKE 1 TABLET BY MOUTH EVERY DAY, Disp
: 30 tablet, Rfl: 0

  levothyroxine 112 mcg tablet, TAKE 1 TABLET BY MOUTH EVERY MORNING ON AN EM
PTY STOMACH WITH WATER, AND WAIT 1 HOUR BEFORE EATING OR TAKING OTHER MEDS, Disp
: 90 tablet, Rfl: 0

  losartan 25 mg tablet, Take 1 tablet by mouth daily., Disp: 30 tablet, Rfl:
1

  VITAMIN K2 ORAL, Take 1 tablet by mouth daily., Disp: , Rfl: 

  rOPINIRole 0.5 mg tablet, Take 1 tablet by mouth at bedtime., Disp: 90 tabl
et, Rfl: 1

  aspirin (ASPIRIN LOW DOSE) 81 mg EC tablet, Take 81 mg by mouth daily., Dis
p: , Rfl: 

  furosemide 40 mg tablet, TK 1 T PO  D, Disp: , Rfl: 3

  gabapentin 300 mg capsule, Take 600 mg by mouth at bedtime., Disp: , Rfl: 5

  isosorbide mononitrate 60 mg 24 hr tablet, TK 1 T PO QD, Disp: , Rfl: 3

  metoprolol succinate XL 25 mg 24 hr tablet, TK 1/2 T PO ONCE D, Disp: , Rfl
: 3

  pantoprazole 40 mg EC tablet, TK 1 T PO ONCE D, Disp: , Rfl: 3

  methylPREDNISolone (MEDROL, DESTINY,) 4 mg tablets, Take  by mouth SEE-INSTRUCT
IONS. follow package directions, Disp: 21 Each, Rfl: 0

  promethazine-dextromethorphan 6.25-15 mg/5 mL syrup, Take 5 mL by mouth 4 (
four) times daily as needed for Cough., Disp: , Rfl: 

  losartan potassium (LOSARTAN ORAL), Take  by mouth., Disp: , Rfl: 

  calcitriol 0.25 mcg capsule, TK 2 CS PO QD, Disp: , Rfl: 0

  calcium carbonate/vitamin D2 (CALCIUM-600-D ORAL), Take  by mouth., Disp: ,
Rfl: 

  valsartan (DIOVAN) 40 mg tablet, Take 40 mg by mouth daily., Disp: , Rfl: 



Histories

Past Medical History: 

Diagnosis Date 

 B12 deficiency  

 CHF (congestive heart failure)  

 Coronary artery disease  

 Diverticulosis  

 Gallstones  

 Heart murmur  

 Hypertension  

 Hypothyroid  

 Insomnia  

 Internal hemorrhoids  

 Meniere's disease  



Past Surgical History: 

Procedure Laterality Date 

 D&C AFTER DELIVERY   

 THYROIDECTOMY   



Social History 



Socioeconomic History 

 Marital status:  

  Spouse name: Not on file 

 Number of children: Not on file 

 Years of education: Not on file 

 Highest education level: Not on file 

Occupational History 

 Not on file 

Social Needs 

 Financial resource strain: Not on file 

 Food insecurity: 

  Worry: Not on file 

  Inability: Not on file 

 Transportation needs: 

  Medical: Not on file 

  Non-medical: Not on file 

Tobacco Use 

 Smoking status: Never Smoker 

 Smokeless tobacco: Never Used 

Substance and Sexual Activity 

 Alcohol use: Yes 

 Drug use: No 

 Sexual activity: Never 

Lifestyle 

 Physical activity: 

  Days per week: Not on file 

  Minutes per session: Not on file 

 Stress: Not on file 

Relationships 

 Social connections: 

  Talks on phone: Not on file 

  Gets together: Not on file 

  Attends [employer] service: Not on file 

  Active member of club or organization: Not on file 

  Attends meetings of clubs or organizations: Not on file 

  Relationship status: Not on file 

 Intimate partner violence: 

  Fear of current or ex partner: Not on file 

  Emotionally abused: Not on file 

  Physically abused: Not on file 

  Forced sexual activity: Not on file 

Other Topics Concern 

 Not on file 

Social History Narrative 

 Not on file 



Family History 

Problem Relation Age of Onset 

 Diabetes Mother  

 Hypertension Mother  

 Heart Mother  

     TIA 

 Depression Mother  

 Hypertension Father  

 Diabetes Father  

 Heart Father  



Review of Systems 

Constitutional: Negative.  

Cardiovascular: Negative.  

Gastrointestinal: Positive for abdominal pain and diarrhea. 

Genitourinary: Negative.  

Musculoskeletal: Positive for arthralgias and myalgias. Negative for neck pain. 

Skin: Positive for rash. 

Neurological: Positive for numbness. Negative for headaches. 

     Restless legs 

Psychiatric/Behavioral: Positive for sleep disturbance. 

Endocrine: Endocrine negative



Vital Signs

/82 (BP Location: Left arm, Patient Position: Sitting)  | Pulse 64  | Wt 2
27 lb 3.2 oz (103.1 kg)  | SpO2 99%  | BMI 42.58 kg/m 



Physical Exam 

Constitutional: She appears well-developed and well-nourished. 

HENT: 

Right Ear: External ear normal. 

Left Ear: External ear normal. 

Mouth/Throat: Oropharynx is clear and moist. 

Eyes: Conjunctivae are normal. 

Neck: Neck supple. 

Cardiovascular: Normal rate, regular rhythm and normal heart sounds. 

Pulmonary/Chest: Effort normal and breath sounds normal. No respiratory distress
. 

Musculoskeletal: Normal range of motion. 

Lymphadenopathy: 

  She has no cervical adenopathy. 

Neurological: She is alert. 

Vitals reviewed.



Assessment/Plan

1. Restless leg syndrome

Will increase and maximize patients Ropinirole.  If this doesn't help we may nee
d to increase her gabapentin.

- rOPINIRole 0.5 mg tablet; 2 tablets 1-3 hrs prior to bedtime x 7 days then if 
needed can go up weekly by 0.5 mg until desired effect or she reaches 3 mg.  Dis
pense: 90 tablet; Refill: 0



2. Motion sickness, initial encounter

- scopolamine transdermal 1 mg over 3 days patch; Apply 1 Patch to area(s) every
72 (seventy-two) hours.  Dispense: 4 Patch; Refill: 0



3. Diarrhea, unspecified type

Will try patient on Imipramine for the diarrhea and will check her blood work to
make sure her diarrhea is not metabolically induced.  Review of her labs show t
hat all her labs are essentially normal.  

- imipramine 25 mg tablet; Take 1 tablet by mouth daily.  Dispense: 30 tablet; R
efill: 2

- CBC WITH DIFFERENTIAL

- COMP. METABOLIC PANEL (05136)

- LIPASE



4. Acquired hypothyroidism

In may her levels were in the hyperthyroid side which can cause diarrhea, repeat
shows normal value.  

- FREE T4

- THYROID STIMULATING HORMONE



This visit did not involve counseling and coordination that comprised more than 
50% of the visit time. 



Electronically signed by Ana Luisa Bautista MD at 2019  4:11 PM CDT

documented in this encounter



Plan of Treatment







                          Care Team                 Description



                     Date                Type                Specialty  

 

                                        



Ana Luisa Bautista MD



[address] [place] [id number] [phone] (Fax)                       



                     2020          Office Visit        Family Medicine  









   



                 Health Maintenance     Due Date        Last Done       Comments

 

   



                           DTaP,Tdap,and Td Vaccines     1960  



                                         (1 - Tdap)   

 

   



                           Zoster Recombinant        1991  



                                         Vaccine (SHINGRIX) (1 of   



                                         2)   

 

   



                           Medicare Wellness Visit     2006  

 

   



                           Osteoporosis Screening     2006  

 

   



                           PNEUMOCOCCAL VACCINES 65+     2006  



                                         (1 of 2 - PCV13)   

 

   



                           INFLUENZA VACCINE (#1)     2019  



documented as of this encounter



Procedures







                                        Comments



                 Procedure Name     Priority        Date/Time       Associated Diagnosis 

 

                                        



Results for this procedure are in the results section.



                 CBC WITH DIFFERENTIAL     Routine         2019      Diarrhea, unspecified 



                           5:21 PM CDT               type 

 

                                        



Results for this procedure are in the results section.



                 COMP. METABOLIC PANEL     Routine         2019      Diarrhea, unspecified 



                     (15699)             5:21 PM CDT         type 

 

                                        



Results for this procedure are in the results section.



                 THYROID STIMULATING     Routine         2019      Acquired hypothyroidism 



                           HORMONE                   5:21 PM CDT  

 

                                        



Results for this procedure are in the results section.



                 FREE T4         Routine         2019      Acquired hypothyroidism 



                                         5:21 PM CDT  

 

                                        



Results for this procedure are in the results section.



                 LIPASE          Routine         2019      Diarrhea, unspecified 



                           5:21 PM CDT               type 



documented in this encounter



Results

* LIPASE (2019  5:21 PM CDT)





    



              Component     Value        Ref Range     Performed At     Pathologist



                                         Signature

 

    



                 LIPASE          42              0 - 220 U/L     [facility] 



                                         [facility] 













                                         Specimen

 





                                         Blood - ARM, RIGHT









   



                 Performing Organization     Address         City/[facility]/Zipcode     Phone Number

 

   



                 [facility]     CLIA: [id number], [address] [place] [address]     [phone]





                           [facility]   





* THYROID STIMULATING HORMONE (2019  5:21 PM CDT)





    



              Component     Value        Ref Range     Performed At     Pathologist



                                         Signature

 

    



                 TSH             1.88            0.45 - 4.70 mIU/L     [facility] 



                                         [facility] 













                                         Specimen

 





                                         Blood - ARM, RIGHT









   



                 Performing Organization     Address         City/State/Zipcode     Phone Number

 

   



                 [facility]     CLIA: [id number], [address] [place] [address]     [phone]





                           [facility]   





* FREE T4 (2019  5:21 PM CDT)





    



              Component     Value        Ref Range     Performed At     Pathologist



                                         Signature

 

    



                 FREE T4         1.34            0.78 - 2.20 ng/dL:     [facility] 



                                         [facility] 













                                         Specimen

 





                                         Blood - ARM, RIGHT









   



                 Performing Organization     Address         City/[facility]/Zipcode     Phone Number

 

   



                 [facility]     CLIA:  [id number], [address] [place] [id number] [phone]





                                         [address]  





* COMP. METABOLIC PANEL (79288) (2019  5:21 PM CDT)





    



              Component     Value        Ref Range     Performed At     Pathologist



                                         Signature

 

    



                 NA              143             135 - 145 mmol/L     [facility] 



                                         [facility] 

 

    



                 K               4.4             3.5 - 5.0 mmol/L     [facility] 



                                         [facility] 

 

    



                 CL              102             98 - 108 mmol/L     [facility] 



                                         [facility] 

 

    



                 CO2 TOTAL       30              23 - 31 mmol/L     [facility] 



                                         [facility] 

 

    



                 AGAP            11              2 - 16          [facility] 



                                         [facility] 

 

    



                 BUN             37 (H)          7 - 23 mg/dL     [facility] 



                                         [facility] 

 

    



                 GLUCOSE         107             70 - 110 mg/dL     [facility] 



                                         [facility] 

 

    



                 CREATININE      1.25 (H)        0.50 - 1.04 mg/dL     [facility] 



                                         [facility] 

 

    



                 TOTAL BILI      0.6             0.1 - 1.1 mg/dL     [facility] 



                                         [facility] 

 

    



                 CALCIUM         8.2 (L)         8.6 - 10.6 mg/dL     [facility] 



                                         [facility] 

 

    



                 T PROTEIN       7.6             6.3 - 8.2 g/dL     [facility] 

 

    



                 ALBUMIN         4.2             3.5 - 5.0 g/dL     [facility] 



                                         [facility] 

 

    



                 ALK PHOS        70              34 - 122 U/L     [facility] 

 

    



                 ALT(SGPT)       19              9 - 51 U/L      [facility] 

 

    



                 AST(SGOT)       27              13 - 40 U/L     [facility] 



                                         [facility] 

 

    



                 eGFR            41.4            mL/min/1.73m2     [facility] 



                           Calculation               [facility] 



                           (Non-[facility] 



                                         American)    

 

    



                 eGFR            50.2            mL/min/1.73m2     [facility] 



                           Calculation               [facility] 



                           ([facility] 



                                         American)    













                                         Specimen

 





                                         Blood - ARM, RIGHT









 



                           Narrative                 Performed At

 

 



                                         Association of Glomerular Filtration Rate (GFR) and Staging of Kidney Disease* 

                                         [facility]



                           +-----------------------+---------------------+-------------------------+     [facility]



                           | GFR (mL/min/1.73 m2)| With Kidney Damage|Without Kidney Damage     

CAMPUS



                                         +-----------------------+---------------------+-------------------------+ 



                                         |>90|Stage 



                                         one| Normal 



                                         +-----------------------+---------------------+-------------------------+ 



                                         |60-89|Stage 



                                         two| Decreased GFR 



                                         +-----------------------+---------------------+-------------------------+ 



                                         |30-59|Stage three|

 



                                         Stage three 



                                         +-----------------------+---------------------+-------------------------+ 



                                         |15-29|Stage four |

 



                                         Stage four 



                                         +-----------------------+---------------------+-------------------------+ 



                                         |<15 (or dialysis)|Stage five | Stage 



                                         five 



                                         +-----------------------+---------------------+-------------------------+ 



                                         *Each stage assumes the associated GFR level has been in effect for at least 



                                         three months.Stages 1 to 5, with or without kidney disease, indicate chronic

 



                                         kidney disease. 



                                         Notes: Determination of stages one and two (with eGFR >59mL/min/1.73 m2) 



                                         requires estimation of kidney damage for at least three months as defined by 



                                         structural or functional abnormalities of the kidney, manifested by either: 



                                         Pathological abnormalities or Markers of kidney damage (including abnormalities

 



                                         in the composition of the blood or urine or abnormalities in imaging tests). 









   



                 Performing Organization     Address         City/State/Zipcode     Phone Number

 

   



                 [facility]     CLIA: [id number], [address] [place] [address]     [phone]





                           [facility]   





* CBC WITH DIFFERENTIAL (2019  5:21 PM CDT)





    



              Component     Value        Ref Range     Performed At     Pathologist



                                         Signature

 

    



                 WBC             8.27            4.30 - 11.10     [facility] 



                           10*3/L                  [facility] 

 

    



                 RBC             4.34            3.93 - 5.25 10*6/L     [facility] 



                                         [facility] 

 

    



                 HGB             12.4            11.6 - 15.0 g/dL     [facility] 



                                         [facility] 

 

    



                 HCT             38.6            35.7 - 45.2 %     [facility] 

 

    



                 MCV             88.9            80.6 - 95.5 fL     [facility] 

 

    



                 MCH             28.6            25.9 - 32.8 pg     [facility] 

 

    



                 MCHC            32.1            31.6 - 35.1 g/dL     [facility] 

 

    



                 RDW-SD          44.2            39.0 - 49.9 fL     [facility] 

 

    



                 RDW-CV          13.5            12.0 - 15.5 %     [facility] 

 

    



                 PLT             310             166 - 358 10*3/L     [facility] 



                                         [facility] 

 

    



                 MPV             10.9            9.5 - 12.9 fL     [facility] 



                                         [facility] 

 

    



                 NRBC/100 WBC     0.0             0.0 - 10.0 /100 WBCs     [facility] 



                                         [facility] 

 

    



                 NRBC x10^3      <0.01           10*3/L        [facility] 

 

    



                 GRAN MAT (NEUT)     64.0            %               [facility] 



                           [facility] 

 

    



                 IMM GRAN %      0.40            %               [facility] 



                                         [facility] 

 

    



                 LYMPH %         21.3            %               [facility] 

 

    



                 MONO %          9.2             %               [facility] 

 

    



                 EOS %           4.0             %               [facility] 

 

    



                 BASO %          1.1             %               [facility] 

 

    



                 GRAN MAT        5.30            1.88 - 7.09 10*3/uL     [facility] 



                           x10^3(ANC)                [facility] 

 

    



                 IMM GRAN x10^3     0.03            0.00 - 0.06 10*3/uL     [facility] 



                                         [facility] 

 

    



                 LYMPH x10^3     1.76            1.32 - 3.29 10*3/uL     [facility] 



                                         [facility] 

 

    



                 MONO x10^3      0.76            0.33 - 0.92 10*3/uL     [facility] 



                                         [facility] 

 

    



                 EOS x10^3       0.33            0.03 - 0.39 10*3/uL     [facility] 



                                         [facility] 

 

    



                 BASO x10^3      0.09 (H)        0.01 - 0.07 10*3/uL     [facility] 



                                         [facility] 













                                         Specimen

 





                                         Blood - ARM, RIGHT









   



                 Performing Organization     Address         City/State/Zipcode     Phone Number

 

   



                 [facility]     CLIA: [id number], [address] [place] [address]     [phone]





                           [facility]   





documented in this encounter



Visit Diagnoses











                                         Diagnosis

 





                                         Restless leg syndrome - Primary



                                         Restless legs syndrome (RLS)

 





                                         Motion sickness, initial encounter

 





                                         Diarrhea, unspecified type

 





                                         Acquired hypothyroidism



                                         Unspecified hypothyroidism



documented in this encounter



Insurance







                                        Type



            Payer      Benefit     Subscriber ID     Effective     Phone      Address 



                           Plan /                    Dates   



                                         Group     

 

                                        Medicare Adv HMO [employer]     [employer]            [id number]       2019-P   



                     MANAGED MEDICARE     MEDICARE            resent   



                                         COMPLETE     









     



            Guarantor Name     Account     Relation to     Date of     Phone      Billing Address



                     Type                Patient             Birth  

 

     



            Marii Hewitt     Personal/F     Self       1940     [phone] [address] [facility]               ([address])              [place] [address]



documented as of this encounter

## 2019-11-22 NOTE — XMS REPORT
Summary of Care

                             Created on: 2019



Marii Hewitt

External Reference #: [id number]

: 1940

Sex: Female



Demographics







                          Address                   [address] [place]  [address]

 

                          Home Phone                [phone]

 

                          Preferred Language        English

 

                          Marital Status            

 

                          Sikhism Affiliation     000

 

                          Race                      White

 

                          Ethnic Group              Non-





Author







                          Author                    [facility]

 

                          Organization              [facility]

 

                          Address                   Unknown

 

                          Phone                     Unavailable







Support







                Name            Relationship    Address         Phone

 

                    Eddie Hewitt     ECON                [address] [place]  [address]                     [phone]







Care Team Providers







                    Care Team Member Name    Role                Phone

 

                    Ana Luisa Bautista MD    PCP                 [phone]







Encounter Details







                          Care Team                 Description



                     Date                Type                Department  

 

                                        



[facility] [place]



[address] [place] [address]                      



                     2019          Orders Only         [address] [address]  







Allergies







                                        Comments



                 Active Allergy     Reactions       Severity        Noted Date 

 

                                        



Dry Mouth



                 Lisinopril      Other - See     High            2019 



                                         comments   



documented as of this encounter (statuses as of 2019)



Medications







                          End Date                  Status



              Medication     Sig          Dispensed     Refills      Start  



                                         Date  

 

                                                    Active



                 gabapentin 300 mg capsule     Take 600 mg      5                 



                           by mouth at               8  



                                         bedtime.     

 

                                                    Active



                 pantoprazole 40 mg EC     TK 1 T PO       3                 



                     tablet              ONCE D              8  

 

                                                    Active



                 metoprolol succinate XL     TK 1/2 T PO      3                 



                     25 mg 24 hr tablet     ONCE D              8  

 

                                                    Active



                 isosorbide mononitrate 60     TK 1 T PO QD      3                 



                           mg 24 hr tablet           8  

 

                                                    Active



                 furosemide 40 mg tablet     TK 1 T PO  D      3                 



                                         8  

 

                                                    Active



                 calcitriol 0.25 mcg     TK 2 CS PO QD      0                 



                           capsule                   8  

 

                                                    Active



                     aspirin (ASPIRIN LOW     Take 81 mg by       0   



                           DOSE) 81 mg EC tablet     mouth daily.     

 

                                                    Active



                     valsartan (DIOVAN) 40 mg     Take 40 mg by       0   



                           tablet                    mouth daily.     

 

                                                    Active



                     calcium carbonate/vitamin     Take  by            0   



                           D2 (CALCIUM-600-D ORAL)     mouth.     

 

                                                    Active



                     losartan potassium     Take  by            0   



                           (LOSARTAN                 mouth.     



                                         ORAL)Indications:      



                                         Essential hypertension      

 

                                                    Active



                     promethazine-dextromethor     Take 5 mL by        0   



                           tabares 6.25-15 mg/5 mL      mouth 4     



                           syrup                     (four) times     



                                         daily as     



                                         needed for     



                                         Cough.     

 

                                                    Active



              methylPREDNISolone     Take  by     21 Each      0              



                     (MEDROL, DESTINY,) 4 mg     mouth               9  



                           tabletsIndications:       SEE-INSTRUCTI     



                           Bronchitis                ONS. follow     



                                         package     



                                         directions     

 

                                                    Active



              rOPINIRole 0.5 mg     Take 1 tablet     90 tablet     1              



                     tabletIndications:     by mouth at         9  



                           Restless leg syndrome     bedtime.     

 

                                                    Active



                     VITAMIN K2 ORAL     Take 1 tablet       0   



                                         by mouth     



                                         daily.     

 

                                                    Active



              losartan 25 mg     Take 1 tablet     30 tablet     1              



                     tabletIndications:     by mouth            9  



                           Essential hypertension     daily.     

 

                                                    Active



              levothyroxine 112 mcg     TAKE 1 TABLET     90 tablet     0              



                     tablet              BY MOUTH            9  



                                         EVERY MORNING     



                                         ON AN EMPTY     



                                         STOMACH WITH     



                                         WATER, AND     



                                         WAIT 1 HOUR     



                                         BEFORE EATING     



                                         OR TAKING     



                                         OTHER MEDS     

 

                                                    Active



              POTASSIUM CHLORIDE 10 mEq     TAKE 1 TABLET     30 tablet     0              



                     CR tablet           BY MOUTH            9  



                                         EVERY DAY     



documented as of this encounter (statuses as of 2019)



Active Problems





Not on filedocumented as of this encounter (statuses as of 2019)



Social History







                                        Date



                 Tobacco Use     Types           Packs/Day       Years Used 

 

                                         



                                         Never Smoker    

 

    



                                         Smokeless Tobacco: Never   



                                         Used   









                    Drinks/Week         oz/Week             Comments



                                         Alcohol Use   

 

                                                             



                                         Yes   









 



                           Sex Assigned at Birth     Date Recorded

 

 



                                         Not on file 









                                        Industry



                           Job Start Date            Occupation 

 

                                        Not on file



                           Not on file               Not on file 









                                        Travel End



                           Travel History            Travel Start 

 





                                         No recent travel history available.



documented as of this encounter



Last Filed Vital Signs

Not on filedocumented in this encounter



Plan of Treatment







   



                 Health Maintenance     Due Date        Last Done       Comments

 

   



                           DTaP,Tdap,and Td Vaccines     1960  



                                         (1 - Tdap)   

 

   



                           Zoster Recombinant        1991  



                                         Vaccine (SHINGRIX) (1 of   



                                         2)   

 

   



                           Medicare Wellness Visit     2006  

 

   



                           Osteoporosis Screening     2006  

 

   



                           PNEUMOCOCCAL VACCINES 65+     2006  



                                         (1 of 2 - PCV13)   

 

   



                           INFLUENZA VACCINE (#1)     2019  



documented as of this encounter



Procedures







                                        Comments



                 Procedure Name     Priority        Date/Time       Associated Diagnosis 

 

                                         



                     NO SHOW OR MISSED     Routine             2019  



                           APPOINTMENT POLICY        3:15 PM CDT  



                                         ACKNOWLEDGEMENT    



documented in this encounter



Results

Not on filedocumented in this encounter



Insurance







                                        Type



            Payer      Benefit     Subscriber ID     Effective     Phone      Address 



                           Plan /                    Dates   



                                         Group     

 

                                        Medicare Adv [facility] -     [facility]            [id number]       2019-P   



                     MANAGED MEDICARE     MEDICARE            resent   



                                         COMPLETE     



documented as of this encounter

## 2019-11-22 NOTE — XMS REPORT
Summary of Care

                             Created on: 2019



Marii Hewitt

External Reference #: [id number]

: 1940

Sex: Female



Demographics







                          Address                   [address] [place] [address]  [address]

 

                          Home Phone                [phone]

 

                          Preferred Language        English

 

                          Marital Status            

 

                          Adventist Affiliation     000

 

                          Race                      White

 

                          Ethnic Group              Non-





Author







                          Author                    [facility]

 

                          Organization              [facility]

 

                          Address                   Unknown

 

                          Phone                     Unavailable







Support







                Name            Relationship    Address         Phone

 

                    Eddie Hewitt     [employer]                [address] [place]  [address]                     [phone]







Care Team Providers







                    Care Team Member Name    Role                Phone

 

                    Ana Luisa Bautista MD    PCP                 [phone]







Reason for Visit

* 





 



                           Reason                    Comments

 

 



                                         Refill Request 









Encounter Details







                          Care Team                 Description



                     Date                Type                Department  

 

                                        



Ana Luisa Bautista MD



[address] [place] [id number] [phone] (Fax)                      Refill Request



                     2019          Refill              [facility]  



                                         [facility], [address]  



                                         [place] [address] [phone]  







Allergies







                                        Comments



                 Active Allergy     Reactions       Severity        Noted Date 

 

                                        



Dry Mouth



                 Lisinopril      Other - See     High            2019 



                                         comments   



documented as of this encounter (statuses as of 2019)



Medications







                          End Date                  Status



              Medication     Sig          Dispensed     Refills      Start  



                                         Date  

 

                                                    Active



                 gabapentin 300 mg capsule     Take 600 mg      5                 



                           by mouth at               8  



                                         bedtime.     

 

                                                    Active



                 pantoprazole 40 mg EC     TK 1 T PO       3                 



                     tablet              ONCE D              8  

 

                                                    Active



                 metoprolol succinate XL     TK 1/2 T PO      3                 



                     25 mg 24 hr tablet     ONCE D              8  

 

                                                    Active



                 isosorbide mononitrate 60     TK 1 T PO QD      3                 



                           mg 24 hr tablet           8  

 

                                                    Active



                 furosemide 40 mg tablet     TK 1 T PO  D      3                 



                                         8  

 

                                                    Active



                 calcitriol 0.25 mcg     TK 2 CS PO QD      0                 



                           capsule                   8  

 

                                                    Active



                     aspirin (ASPIRIN LOW     Take 81 mg by       0   



                           DOSE) 81 mg EC tablet     mouth daily.     

 

                                                    Active



                     valsartan (DIOVAN) 40 mg     Take 40 mg by       0   



                           tablet                    mouth daily.     

 

                                                    Active



                     calcium carbonate/vitamin     Take  by            0   



                           D2 (CALCIUM-600-D ORAL)     mouth.     

 

                                                    Active



                     losartan potassium     Take  by            0   



                           (LOSARTAN                 mouth.     



                                         ORAL)Indications:      



                                         Essential hypertension      

 

                                                    Active



                     promethazine-dextromethor     Take 5 mL by        0   



                           tabares 6.25-15 mg/5 mL      mouth 4     



                           syrup                     (four) times     



                                         daily as     



                                         needed for     



                                         Cough.     

 

                                                    Active



              methylPREDNISolone     Take  by     21 Each      0              



                     (MEDROL, DESTINY,) 4 mg     mouth               9  



                           tabletsIndications:       SEE-INSTRUCTI     



                           Bronchitis                ONS. follow     



                                         package     



                                         directions     

 

                                                    Active



              rOPINIRole 0.5 mg     Take 1 tablet     90 tablet     1              



                     tabletIndications:     by mouth at         9  



                           Restless leg syndrome     bedtime.     

 

                                                    Active



                     VITAMIN K2 ORAL     Take 1 tablet       0   



                                         by mouth     



                                         daily.     

 

                                                    Active



              losartan 25 mg     Take 1 tablet     30 tablet     1              



                     tabletIndications:     by mouth            9  



                           Essential hypertension     daily.     

 

                                                    Active



              levothyroxine 112 mcg     TAKE 1 TABLET     90 tablet     0              



                     tablet              BY MOUTH            9  



                                         EVERY MORNING     



                                         ON AN EMPTY     



                                         STOMACH WITH     



                                         WATER, AND     



                                         WAIT 1 HOUR     



                                         BEFORE EATING     



                                         OR TAKING     



                                         OTHER MEDS     

 

                                                    Active



              POTASSIUM CHLORIDE 10 mEq     TAKE 1 TABLET     30 tablet     0              



                     CR tablet           BY MOUTH            9  



                                         EVERY DAY     

 

                          2019                Discontinued



              POTASSIUM CHLORIDE 10 mEq     TAKE 1 TABLET     30 tablet     0              



                     CR tablet           BY MOUTH            9  



                                         EVERY DAY     



documented as of this encounter (statuses as of 2019)



Active Problems





Not on filedocumented as of this encounter (statuses as of 2019)



Social History







                                        Date



                 Tobacco Use     Types           Packs/Day       Years Used 

 

                                         



                                         Never Smoker    

 

    



                                         Smokeless Tobacco: Never   



                                         Used   









                    Drinks/Week         oz/Week             Comments



                                         Alcohol Use   

 

                                                             



                                         Yes   









 



                           Sex Assigned at Birth     Date Recorded

 

 



                                         Not on file 









                                        Industry



                           Job Start Date            Occupation 

 

                                        Not on file



                           Not on file               Not on file 









                                        Travel End



                           Travel History            Travel Start 

 





                                         No recent travel history available.



documented as of this encounter



Last Filed Vital Signs

Not on filedocumented in this encounter



Plan of Treatment







                          Care Team                 Description



                     Date                Type                Specialty  

 

                                        



Ana Luisa Bautista MD



[address] [place] [id number] [phone] (Fax)                       



                     2019          Office Visit        Family Medicine  









   



                 Health Maintenance     Due Date        Last Done       Comments

 

   



                           DTaP,Tdap,and Td Vaccines     1960  



                                         (1 - Tdap)   

 

   



                           Zoster Recombinant        1991  



                                         Vaccine (SHINGRIX) (1 of   



                                         2)   

 

   



                           Medicare Wellness Visit     2006  

 

   



                           Osteoporosis Screening     2006  

 

   



                           PNEUMOCOCCAL VACCINES 65+     2006  



                                         (1 of 2 - PCV13)   

 

   



                           INFLUENZA VACCINE (#1)     2019  



documented as of this encounter



Results

Not on filedocumented in this encounter



Insurance







                                        Type



            Payer      Benefit     Subscriber ID     Effective     Phone      Address 



                           Plan /                    Dates   



                                         Group     

 

                                        Medicare Adv [facility] -     [facility]            [id number]       2019-P   



                     MANAGED MEDICARE     MEDICARE            resent   



                                         COMPLETE     



documented as of this encounter

## 2019-11-22 NOTE — XMS REPORT
Summary of Care

                             Created on: 2019



Marii Hewitt

External Reference #: [id number]

: 1940

Sex: Female



Demographics







                          Address                   [address] [place] [address]  [address]

 

                          Home Phone                [phone]

 

                          Preferred Language        English

 

                          Marital Status            

 

                          Roman Catholic Affiliation     000

 

                          Race                      White

 

                          Ethnic Group              Non-





Author







                          Author                    [facility]

 

                          Organization              [facility]

 

                          Address                   Unknown

 

                          Phone                     Unavailable







Support







                Name            Relationship    Address         Phone

 

                    Eddie Hewitt     [employer]                [address] [place]  [address]                     [phone]







Care Team Providers







                    Care Team Member Name    Role                Phone

 

                    Ana Luisa Bautista MD    PCP                 [phone]







Reason for Visit

* 





 



                           Reason                    Comments

 

 



                                         Refill Request 









Encounter Details







                          Care Team                 Description



                     Date                Type                Department  

 

                                        



Ana Luisa Bautista MD



[address] [place] [id number] [phone] (Fax)                      Refill Request



                     2019          Refill              [facility]  



                                         [facility], [address]  



                                         [place] [address] [phone]  







Allergies







                                        Comments



                 Active Allergy     Reactions       Severity        Noted Date 

 

                                        



Dry Mouth



                 Lisinopril      Other - See     High            2019 



                                         comments   



documented as of this encounter (statuses as of 2019)



Medications







                          End Date                  Status



              Medication     Sig          Dispensed     Refills      Start  



                                         Date  

 

                                                    Active



                 gabapentin 300 mg capsule     Take 600 mg      5                 



                           by mouth at               8  



                                         bedtime.     

 

                                                    Active



                 pantoprazole 40 mg EC     TK 1 T PO       3                 



                     tablet              ONCE D              8  

 

                                                    Active



                 metoprolol succinate XL     TK 1/2 T PO      3                 



                     25 mg 24 hr tablet     ONCE D              8  

 

                                                    Active



                 isosorbide mononitrate 60     TK 1 T PO QD      3                 



                           mg 24 hr tablet           8  

 

                                                    Active



                 furosemide 40 mg tablet     TK 1 T PO  D      3                 



                                         8  

 

                                                    Active



                 calcitriol 0.25 mcg     TK 2 CS PO QD      0                 



                           capsule                   8  

 

                                                    Active



                     aspirin (ASPIRIN LOW     Take 81 mg by       0   



                           DOSE) 81 mg EC tablet     mouth daily.     

 

                                                    Active



                     valsartan (DIOVAN) 40 mg     Take 40 mg by       0   



                           tablet                    mouth daily.     

 

                                                    Active



                     calcium carbonate/vitamin     Take  by            0   



                           D2 (CALCIUM-600-D ORAL)     mouth.     

 

                                                    Active



                     losartan potassium     Take  by            0   



                           (LOSARTAN                 mouth.     



                                         ORAL)Indications:      



                                         Essential hypertension      

 

                                                    Active



                     promethazine-dextromethor     Take 5 mL by        0   



                           tabares 6.25-15 mg/5 mL      mouth 4     



                           syrup                     (four) times     



                                         daily as     



                                         needed for     



                                         Cough.     

 

                                                    Active



              methylPREDNISolone     Take  by     21 Each      0              



                     (MEDROL, DESTINY,) 4 mg     mouth               9  



                           tabletsIndications:       SEE-INSTRUCTI     



                           Bronchitis                ONS. follow     



                                         package     



                                         directions     

 

                                                    Active



              rOPINIRole 0.5 mg     Take 1 tablet     90 tablet     1              



                     tabletIndications:     by mouth at         9  



                           Restless leg syndrome     bedtime.     

 

                                                    Active



                     VITAMIN K2 ORAL     Take 1 tablet       0   



                                         by mouth     



                                         daily.     

 

                                                    Active



              levothyroxine 112 mcg     TAKE 1 TABLET     60 tablet     0              



                     tablet              BY MOUTH            9  



                                         EVERY MORNING     



                                         ON AN EMPTY     



                                         STOMACH WITH     



                                         WATER, AND     



                                         WAIT 1 HOUR     



                                         BEFORE EATING     



                                         OR TAKING     



                                         OTHER MEDS     

 

                                                    Active



              losartan 25 mg     Take 1 tablet     30 tablet     1              



                     tabletIndications:     by mouth            9  



                           Essential hypertension     daily.     

 

                                                    Active



              POTASSIUM CHLORIDE 10 mEq     TAKE 1 TABLET     30 tablet     0              



                     CR tablet           BY MOUTH            9  



                                         EVERY DAY     

 

                          2019                Discontinued



              POTASSIUM CHLORIDE 10 mEq     TAKE 1 TABLET     30 tablet     0              



                     CR tablet           BY MOUTH            9  



                                         EVERY DAY     



documented as of this encounter (statuses as of 2019)



Active Problems





Not on filedocumented as of this encounter (statuses as of 2019)



Social History







                                        Date



                 Tobacco Use     Types           Packs/Day       Years Used 

 

                                         



                                         Never Smoker    

 

    



                                         Smokeless Tobacco: Never   



                                         Used   









                    Drinks/Week         oz/Week             Comments



                                         Alcohol Use   

 

                                                             



                                         Yes   









 



                           Sex Assigned at Birth     Date Recorded

 

 



                                         Not on file 









                                        Industry



                           Job Start Date            Occupation 

 

                                        Not on file



                           Not on file               Not on file 









                                        Travel End



                           Travel History            Travel Start 

 





                                         No recent travel history available.



documented as of this encounter



Last Filed Vital Signs

Not on filedocumented in this encounter



Plan of Treatment







   



                 Health Maintenance     Due Date        Last Done       Comments

 

   



                           DTaP,Tdap,and Td Vaccines     1960  



                                         (1 - Tdap)   

 

   



                           Zoster Recombinant        1991  



                                         Vaccine (SHINGRIX) (1 of   



                                         2)   

 

   



                           Medicare Wellness Visit     2006  

 

   



                           Osteoporosis Screening     2006  

 

   



                           PNEUMOCOCCAL VACCINES 65+     2006  



                                         (1 of 2 - PCV13)   

 

   



                           INFLUENZA VACCINE         2019  



documented as of this encounter



Results

Not on filedocumented in this encounter



Insurance







                                        Type



            Payer      Benefit     Subscriber ID     Effective     Phone      Address 



                           Plan /                    Dates   



                                         Group     

 

                                        Medicare Adv [facility]            [id number]       2019-P   



                     MANAGED MEDICARE     MEDICARE            resent   



                                         COMPLETE     



documented as of this encounter

## 2019-11-22 NOTE — XMS REPORT
Patient Summary Document

                             Created on: 2019



BERNARDO KOROMA

External Reference #: [id number]

: 1940

Sex: Female



Demographics







                          Address                   [address] [place] [address]  [address]

 

                          Home Phone                [phone]

 

                          Preferred Language        Unknown

 

                          Marital Status            Unknown

 

                          Taoism Affiliation     Unknown

 

                          Race                      Unknown

 

                                        Additional Race(s)  

 

                          Ethnic Group              Unknown





Author







                          Author                    [facility]

 

                          Address                   Unknown

 

                          Phone                     Unavailable







Care Team Providers







                    Care Team Member Name    Role                Phone

 

                          Unavailable               Unavailable







Problems

This patient has no known problems.



Allergies, Adverse Reactions, Alerts

This patient has no known allergies or adverse reactions.



Medications

This patient has no known medications.

## 2019-11-22 NOTE — XMS REPORT
Summary of Care

                             Created on: 2019



Marii Hewitt

External Reference #: [id number]

: 1940

Sex: Female



Demographics







                          Address                   [address] [place] [address]  [address]

 

                          Home Phone                [phone]

 

                          Preferred Language        English

 

                          Marital Status            

 

                          Shinto Affiliation     000

 

                          Race                      White

 

                          Ethnic Group              Non-





Author







                          Author                    [facility]

 

                          Organization              [facility]

 

                          Address                   Unknown

 

                          Phone                     Unavailable







Support







                Name            Relationship    Address         Phone

 

                    Eddie Hewitt     [employer]                [address] [place]  [address]                     [phone]







Care Team Providers







                    Care Team Member Name    Role                Phone

 

                    Ana Luisa Bautista MD    PCP                 [phone]







Reason for Visit

* 





 



                           Reason                    Comments

 

 



                                         Refill Request 









Encounter Details







                          Care Team                 Description



                     Date                Type                Department  

 

                                        



Ana Luisa Bautista MD



[address] [id number] [phone] (Fax)                      Refill Request



                     2019          Refill              [facility]  



                                         [facility]  



                                         [address]  



                                         [id number] [phone]  







Allergies







                                        Comments



                 Active Allergy     Reactions       Severity        Noted Date 

 

                                        



Dry Mouth



                 Lisinopril      Other - See     High            2019 



                                         comments   



documented as of this encounter (statuses as of 2019)



Medications







                          End Date                  Status



              Medication     Sig          Dispensed     Refills      Start  



                                         Date  

 

                                                    Active



                 gabapentin 300 mg capsule     Take 600 mg      5                 



                           by mouth at               8  



                                         bedtime.     

 

                                                    Active



                 pantoprazole 40 mg EC     TK 1 T PO       3                 



                     tablet              ONCE D              8  

 

                                                    Active



                 metoprolol succinate XL     TK 1/2 T PO      3                 



                     25 mg 24 hr tablet     ONCE D              8  

 

                                                    Active



                 isosorbide mononitrate 60     TK 1 T PO QD      3                 



                           mg 24 hr tablet           8  

 

                                                    Active



                 furosemide 40 mg tablet     TK 1 T PO  D      3                 



                                         8  

 

                                                    Active



                 calcitriol 0.25 mcg     TK 2 CS PO QD      0                 



                           capsule                   8  

 

                                                    Active



                     aspirin (ASPIRIN LOW     Take 81 mg by       0   



                           DOSE) 81 mg EC tablet     mouth daily.     

 

                                                    Active



                     valsartan (DIOVAN) 40 mg     Take 40 mg by       0   



                           tablet                    mouth daily.     

 

                                                    Active



                     calcium carbonate/vitamin     Take  by            0   



                           D2 (CALCIUM-600-D ORAL)     mouth.     

 

                                                    Active



                     losartan potassium     Take  by            0   



                           (LOSARTAN                 mouth.     



                                         ORAL)Indications:      



                                         Essential hypertension      

 

                                                    Active



                     promethazine-dextromethor     Take 5 mL by        0   



                           tabares 6.25-15 mg/5 mL      mouth 4     



                           syrup                     (four) times     



                                         daily as     



                                         needed for     



                                         Cough.     

 

                                                    Active



              methylPREDNISolone     Take  by     21 Each      0              



                     (MEDROL, DESTINY,) 4 mg     mouth               9  



                           tabletsIndications:       SEE-INSTRUCTI     



                           Bronchitis                ONS. follow     



                                         package     



                                         directions     

 

                                                    Active



                     VITAMIN K2 ORAL     Take 1 tablet       0   



                                         by mouth     



                                         daily.     

 

                                                    Active



              losartan 25 mg     Take 1 tablet     30 tablet     1              



                     tabletIndications:     by mouth            9  



                           Essential hypertension     daily.     

 

                                                    Active



              levothyroxine 112 mcg     TAKE 1 TABLET     90 tablet     0              



                     tablet              BY MOUTH            9  



                                         EVERY MORNING     



                                         ON AN EMPTY     



                                         STOMACH WITH     



                                         WATER, AND     



                                         WAIT 1 HOUR     



                                         BEFORE EATING     



                                         OR TAKING     



                                         OTHER MEDS     

 

                                                    Active



              POTASSIUM CHLORIDE 10 mEq     TAKE 1 TABLET     30 tablet     0              



                     CR tablet           BY MOUTH            9  



                                         EVERY DAY     

 

                                                    Active



                     ergocalciferol, vitamin     Take 4,000          0   



                           D2, (VITAMIN D ORAL)      Units by     



                                         mouth daily.     

 

                                                    Active



              scopolamine transdermal 1     Apply 1 Patch     4 Patch      0              



                     mg over 3 days      to area(s)          9  



                           patchIndications: Motion     every 72     



                           sickness, initial         (seventy-two)     



                           encounter                 hours.     

 

                                                    Active



              imipramine 25 mg     Take 1 tablet     30 tablet     2              



                     tabletIndications:     by mouth            9  



                           Diarrhea, unspecified     daily.     



                                         type      

 

                                                    Active



              rOPINIRole 0.5 mg     TAKE 2       270 tablet     0              



                     tabletIndications:     TABLETS BY          9  



                           Restless leg syndrome     MOUTH 1 TO 3     



                                         HOURS BEFORE     



                                         BEDTIME X 7     



                                         DAYS. THEN     



                                         YOU CAN     



                                         INCREASE DOSE     



                                         BY 0.5 MG     



                                         UNTIL DESIRED     



                                         EFFECT OR YOU     



                                         REACH 3 MG     

 

                          2019                Discontinued



              rOPINIRole 0.5 mg     2 tablets 1-3     90 tablet     0              



                     tabletIndications:     hrs prior to        9  



                           Restless leg syndrome     bedtime x 7     



                                         days then if     



                                         needed can go     



                                         up weekly by     



                                         0.5 mg until     



                                         desired     



                                         effect or she     



                                         reaches 3 mg.     



documented as of this encounter (statuses as of 2019)



Active Problems





Not on filedocumented as of this encounter (statuses as of 2019)



Social History







                                        Date



                 Tobacco Use     Types           Packs/Day       Years Used 

 

                                         



                                         Never Smoker    

 

    



                                         Smokeless Tobacco: Never   



                                         Used   









                    Drinks/Week         oz/Week             Comments



                                         Alcohol Use   

 

                                                             



                                         Yes   









 



                           Sex Assigned at Birth     Date Recorded

 

 



                                         Not on file 









                                        Industry



                           Job Start Date            Occupation 

 

                                        Not on file



                           Not on file               Not on file 









                                        Travel End



                           Travel History            Travel Start 

 





                                         No recent travel history available.



documented as of this encounter



Last Filed Vital Signs

Not on filedocumented in this encounter



Plan of Treatment







                          Care Team                 Description



                     Date                Type                Specialty  

 

                                        



Ana Luisa Bautista MD



[address] [place] [address] [address]



[place] [address]



[phone] (Fax)                       



                     2020          Office Visit        Family Medicine  









   



                 Health Maintenance     Due Date        Last Done       Comments

 

   



                           DTaP,Tdap,and Td Vaccines     1960  



                                         (1 - Tdap)   

 

   



                           Zoster Recombinant        1991  



                                         Vaccine (SHINGRIX) (1 of   



                                         2)   

 

   



                           Medicare Wellness Visit     2006  

 

   



                           Osteoporosis Screening     2006  

 

   



                           PNEUMOCOCCAL VACCINES 65+     2006  



                                         (1 of 2 - PCV13)   

 

   



                           INFLUENZA VACCINE (#1)     2019  



documented as of this encounter



Results

Not on filedocumented in this encounter



Visit Diagnoses











                                         Diagnosis

 





                                         Restless leg syndrome



                                         Restless legs syndrome (RLS)



documented in this encounter



Insurance







                                        Type



            Payer      Benefit     Subscriber ID     Effective     Phone      Address 



                           Plan /                    Dates   



                                         Group     

 

                                        Medicare Adv [facility]            [id number]       2019-P   



                     MANAGED MEDICARE     MEDICARE            resent   



                                         COMPLETE     



documented as of this encounter

## 2019-11-22 NOTE — DIAGNOSTIC IMAGING REPORT
EXAMINATION:  CHEST 2 VIEWS    



INDICATION: ^CHEST PRESSURE X1 DAY



COMPARISON:  None

     

FINDINGS:  PA and lateral views



TUBES and LINES:  None.



LUNGS:  Lungs are well inflated. No consolidations. Prominent pulmonary

vasculature.



PLEURA:  No pleural effusion or pneumothorax.



HEART AND MEDIASTINUM:  The cardiomediastinal silhouette is mildly enlarged.

Post surgical changes along the left mediastinum.. Calcifications of the aorta.



BONES AND SOFT TISSUES:  There are degenerative changes in the thoracic spine. 

Soft tissues are unremarkable. Sternotomy wires.



UPPER ABDOMEN: No free air under the diaphragm.    



IMPRESSION: 



Mild cardiomegaly and pulmonary vascular congestion.



Signed by: Alireza Costa DO on 11/22/2019 6:34 AM

## 2019-11-22 NOTE — XMS REPORT
Summary of Care

                             Created on: 2019



Marii Hewitt

External Reference #: [id number]

: 1940

Sex: Female



Demographics







                          Address                   [address] [place] [address]  [address]

 

                          Home Phone                [phone]

 

                          Preferred Language        English

 

                          Marital Status            

 

                          Restoration Affiliation     000

 

                          Race                      White

 

                          Ethnic Group              Non-





Author







                          Author                    [facility]

 

                          Organization              [facility]

 

                          Address                   Unknown

 

                          Phone                     Unavailable







Support







                Name            Relationship    Address         Phone

 

                    Eddie Hewitt     [employer]                [address] [place]  [address]                     [phone]







Care Team Providers







                    Care Team Member Name    Role                Phone

 

                    Ana Luisa Bautista MD    PCP                 [phone]







Reason for Referral

*  (Routine)





                          Referred By Contact       Referred To Contact



                 Status          Reason          Specialty       Diagnoses /  



                                         Procedures  

 

                                        



Ana Luisa Bautista MD



[address]



[address]



[place] [address]



Phone: [phone]



Fax: [phone]                       







                     New Request         Orthopedic          Diagnoses  



                           Surgery                   Chronic knee pain,  



                                         unspecified  



                                         laterality

  



                                         P  



                                         rocedures  



                                         CONSULT/REFERRAL  



                                         ORTHOPAEDIC  



                                         SURGERY  











Reason for Visit

* 





 



                           Reason                    Comments

 

 



                                         Referral/consult 









Encounter Details







                          Care Team                 Description



                     Date                Type                Department  

 

                                        



Ana Luisa Bautista MD



[address]



[facility] [address]



[place] [address] [phone] (Fax)                      Referral/consult



                     2019          Telephone           [facility]  



                                         [facility]  



                                         [facility]  



                                         [address]  



                                         [id number] [phone]  







Allergies







                                        Comments



                 Active Allergy     Reactions       Severity        Noted Date 

 

                                        



Dry Mouth



                 Lisinopril      Other - See     High            2019 



                                         comments   



documented as of this encounter (statuses as of 2019)



Medications







                          End Date                  Status



              Medication     Sig          Dispensed     Refills      Start  



                                         Date  

 

                                                    Active



                 gabapentin 300 mg capsule     Take 600 mg      5                 



                           by mouth at               8  



                                         bedtime.     

 

                                                    Active



                 pantoprazole 40 mg EC     TK 1 T PO       3                 



                     tablet              ONCE D              8  

 

                                                    Active



                 metoprolol succinate XL     TK 1/2 T PO      3                 



                     25 mg 24 hr tablet     ONCE D              8  

 

                                                    Active



                 isosorbide mononitrate 60     TK 1 T PO QD      3                 



                           mg 24 hr tablet           8  

 

                                                    Active



                 furosemide 40 mg tablet     TK 1 T PO  D      3                 



                                         8  

 

                                                    Active



                 calcitriol 0.25 mcg     TK 2 CS PO QD      0                 



                           capsule                   8  

 

                                                    Active



                     aspirin (ASPIRIN LOW     Take 81 mg by       0   



                           DOSE) 81 mg EC tablet     mouth daily.     

 

                                                    Active



                     valsartan (DIOVAN) 40 mg     Take 40 mg by       0   



                           tablet                    mouth daily.     

 

                                                    Active



                     calcium carbonate/vitamin     Take  by            0   



                           D2 (CALCIUM-600-D ORAL)     mouth.     

 

                                                    Active



                     losartan potassium     Take  by            0   



                           (LOSARTAN                 mouth.     



                                         ORAL)Indications:      



                                         Essential hypertension      

 

                                                    Active



                     promethazine-dextromethor     Take 5 mL by        0   



                           tabares 6.25-15 mg/5 mL      mouth 4     



                           syrup                     (four) times     



                                         daily as     



                                         needed for     



                                         Cough.     

 

                                                    Active



              methylPREDNISolone     Take  by     21 Each      0              



                     (MEDROL, DESTINY,) 4 mg     mouth               9  



                           tabletsIndications:       SEE-INSTRUCTI     



                           Bronchitis                ONS. follow     



                                         package     



                                         directions     

 

                                                    Active



                     VITAMIN K2 ORAL     Take 1 tablet       0   



                                         by mouth     



                                         daily.     

 

                                                    Active



              losartan 25 mg     Take 1 tablet     30 tablet     1              



                     tabletIndications:     by mouth            9  



                           Essential hypertension     daily.     

 

                                                    Active



              levothyroxine 112 mcg     TAKE 1 TABLET     90 tablet     0              



                     tablet              BY MOUTH            9  



                                         EVERY MORNING     



                                         ON AN EMPTY     



                                         STOMACH WITH     



                                         WATER, AND     



                                         WAIT 1 HOUR     



                                         BEFORE EATING     



                                         OR TAKING     



                                         OTHER MEDS     

 

                                                    Active



              POTASSIUM CHLORIDE 10 mEq     TAKE 1 TABLET     30 tablet     0              



                     CR tablet           BY MOUTH            9  



                                         EVERY DAY     

 

                                                    Active



                     ergocalciferol, vitamin     Take 4,000          0   



                           D2, (VITAMIN D ORAL)      Units by     



                                         mouth daily.     

 

                                                    Active



              scopolamine transdermal 1     Apply 1 Patch     4 Patch      0              



                     mg over 3 days      to area(s)          9  



                           patchIndications: Motion     every 72     



                           sickness, initial         (seventy-two)     



                           encounter                 hours.     

 

                                                    Active



              imipramine 25 mg     Take 1 tablet     30 tablet     2              



                     tabletIndications:     by mouth            9  



                           Diarrhea, unspecified     daily.     



                                         type      

 

                                                    Active



              rOPINIRole 0.5 mg     TAKE 2       270 tablet     0              



                     tabletIndications:     TABLETS BY          9  



                           Restless leg syndrome     MOUTH 1 TO 3     



                                         HOURS BEFORE     



                                         BEDTIME X 7     



                                         DAYS. THEN     



                                         YOU CAN     



                                         INCREASE DOSE     



                                         BY 0.5 MG     



                                         UNTIL DESIRED     



                                         EFFECT OR YOU     



                                         REACH 3 MG     



documented as of this encounter (statuses as of 2019)



Active Problems





Not on filedocumented as of this encounter (statuses as of 2019)



Social History







                                        Date



                 Tobacco Use     Types           Packs/Day       Years Used 

 

                                         



                                         Never Smoker    

 

    



                                         Smokeless Tobacco: Never   



                                         Used   









                    Drinks/Week         oz/Week             Comments



                                         Alcohol Use   

 

                                                             



                                         Yes   









 



                           Sex Assigned at Birth     Date Recorded

 

 



                                         Not on file 









                                        Industry



                           Job Start Date            Occupation 

 

                                        Not on file



                           Not on file               Not on file 









                                        Travel End



                           Travel History            Travel Start 

 





                                         No recent travel history available.



documented as of this encounter



Last Filed Vital Signs

Not on filedocumented in this encounter



Plan of Treatment







                          Care Team                 Description



                     Date                Type                Specialty  

 

                                        



Ana Luisa Bautista MD



[address] [facility] [address]



[place] [address]



[phone] (Fax)                       



                     2020          Office Visit        Family Medicine  









   



                 Health Maintenance     Due Date        Last Done       Comments

 

   



                           DTaP,Tdap,and Td Vaccines     1960  



                                         (1 - Tdap)   

 

   



                           Zoster Recombinant        1991  



                                         Vaccine (SHINGRIX) (1 of   



                                         2)   

 

   



                           Medicare Wellness Visit     2006  

 

   



                           Osteoporosis Screening     2006  

 

   



                           PNEUMOCOCCAL VACCINES 65+     2006  



                                         (1 of 2 - PCV13)   

 

   



                           INFLUENZA VACCINE (#1)     2019  



documented as of this encounter



Results

Not on filedocumented in this encounter



Visit Diagnoses











                                         Diagnosis

 





                                         Chronic knee pain, unspecified laterality - Primary



documented in this encounter



Insurance







                                        Type



            Payer      Benefit     Subscriber ID     Effective     Phone      Address 



                           Plan /                    Dates   



                                         Group     

 

                                        Medicare Adv O



                 [facility]     [employer]            [id number]       2019-P   



                     MANAGED MEDICARE     MEDICARE            resent   



                                         COMPLETE     



documented as of this encounter

## 2019-11-22 NOTE — XMS REPORT
Summary of Care

                             Created on: 09/10/2019



Marii Hewitt

External Reference #: [id number]

: 1940

Sex: Female



Demographics







                          Address                   [address] [place] [address]  [address]

 

                          Home Phone                [phone]

 

                          Preferred Language        English

 

                          Marital Status            

 

                          Confucianist Affiliation     000

 

                          Race                      White

 

                          Ethnic Group              Non-





Author







                          Author                    [facility]

 

                          Organization              [facility]

 

                          Address                   Unknown

 

                          Phone                     Unavailable







Support







                Name            Relationship    Address         Phone

 

                    Eddie Hewitt     [employer]                [address] [place]  [address]                     [phone]







Care Team Providers







                    Care Team Member Name    Role                Phone

 

                    Ana Luisa Bautista MD    PCP                 [phone]







Reason for Visit

* 





 



                           Reason                    Comments

 

 



                                         Refill Request 









Encounter Details







                          Care Team                 Description



                     Date                Type                Department  

 

                                        



Ana Luisa Bautista MD



[address] [id number] [phone] (Fax)                      Refill Request



                     2019          Refill              [facility]  



                                         [facility]  



                                         [address]  



                                         [id number] [phone]  







Allergies







                                        Comments



                 Active Allergy     Reactions       Severity        Noted Date 

 

                                        



Dry Mouth



                 Lisinopril      Other - See     High            2019 



                                         comments   



documented as of this encounter (statuses as of 09/10/2019)



Medications







                          End Date                  Status



              Medication     Sig          Dispensed     Refills      Start  



                                         Date  

 

                                                    Active



                 gabapentin 300 mg capsule     Take 600 mg      5                 



                           by mouth at               8  



                                         bedtime.     

 

                                                    Active



                 pantoprazole 40 mg EC     TK 1 T PO       3                 



                     tablet              ONCE D              8  

 

                                                    Active



                 metoprolol succinate XL     TK 1/2 T PO      3                 



                     25 mg 24 hr tablet     ONCE D              8  

 

                                                    Active



                 isosorbide mononitrate 60     TK 1 T PO QD      3                 



                           mg 24 hr tablet           8  

 

                                                    Active



                 furosemide 40 mg tablet     TK 1 T PO  D      3                 



                                         8  

 

                                                    Active



                 calcitriol 0.25 mcg     TK 2 CS PO QD      0                 



                           capsule                   8  

 

                                                    Active



                     aspirin (ASPIRIN LOW     Take 81 mg by       0   



                           DOSE) 81 mg EC tablet     mouth daily.     

 

                                                    Active



                     valsartan (DIOVAN) 40 mg     Take 40 mg by       0   



                           tablet                    mouth daily.     

 

                                                    Active



                     calcium carbonate/vitamin     Take  by            0   



                           D2 (CALCIUM-600-D ORAL)     mouth.     

 

                                                    Active



                     losartan potassium     Take  by            0   



                           (LOSARTAN                 mouth.     



                                         ORAL)Indications:      



                                         Essential hypertension      

 

                                                    Active



                     promethazine-dextromethor     Take 5 mL by        0   



                           tabares 6.25-15 mg/5 mL      mouth 4     



                           syrup                     (four) times     



                                         daily as     



                                         needed for     



                                         Cough.     

 

                                                    Active



              methylPREDNISolone     Take  by     21 Each      0              



                     (MEDROL, DESTINY,) 4 mg     mouth               9  



                           tabletsIndications:       SEE-INSTRUCTI     



                           Bronchitis                ONS. follow     



                                         package     



                                         directions     

 

                                                    Active



                     VITAMIN K2 ORAL     Take 1 tablet       0   



                                         by mouth     



                                         daily.     

 

                                                    Active



              losartan 25 mg     Take 1 tablet     30 tablet     1              



                     tabletIndications:     by mouth            9  



                           Essential hypertension     daily.     

 

                                                    Active



              levothyroxine 112 mcg     TAKE 1 TABLET     90 tablet     0              



                     tablet              BY MOUTH            9  



                                         EVERY MORNING     



                                         ON AN EMPTY     



                                         STOMACH WITH     



                                         WATER, AND     



                                         WAIT 1 HOUR     



                                         BEFORE EATING     



                                         OR TAKING     



                                         OTHER MEDS     

 

                                                    Active



              POTASSIUM CHLORIDE 10 mEq     TAKE 1 TABLET     30 tablet     0              



                     CR tablet           BY MOUTH            9  



                                         EVERY DAY     

 

                                                    Active



                     ergocalciferol, vitamin     Take 4,000          0   



                           D2, (VITAMIN D ORAL)      Units by     



                                         mouth daily.     

 

                                                    Active



              scopolamine transdermal 1     Apply 1 Patch     4 Patch      0              



                     mg over 3 days      to area(s)          9  



                           patchIndications: Motion     every 72     



                           sickness, initial         (seventy-two)     



                           encounter                 hours.     

 

                                                    Active



              imipramine 25 mg     Take 1 tablet     30 tablet     2              



                     tabletIndications:     by mouth            9  



                           Diarrhea, unspecified     daily.     



                                         type      

 

                                                    Active



              rOPINIRole 0.5 mg     TAKE 2       270 tablet     0              



                     tabletIndications:     TABLETS BY          9  



                           Restless leg syndrome     MOUTH 1 TO 3     



                                         HOURS BEFORE     



                                         BEDTIME X 7     



                                         DAYS. THEN     



                                         YOU CAN     



                                         INCREASE DOSE     



                                         BY 0.5 MG     



                                         UNTIL DESIRED     



                                         EFFECT OR YOU     



                                         REACH 3 MG     

 

                          2019                Discontinued



              rOPINIRole 0.5 mg     2 tablets 1-3     90 tablet     0              



                     tabletIndications:     hrs prior to        9  



                           Restless leg syndrome     bedtime x 7     



                                         days then if     



                                         needed can go     



                                         up weekly by     



                                         0.5 mg until     



                                         desired     



                                         effect or she     



                                         reaches 3 mg.     



documented as of this encounter (statuses as of 09/10/2019)



Active Problems





Not on filedocumented as of this encounter (statuses as of 09/10/2019)



Social History







                                        Date



                 Tobacco Use     Types           Packs/Day       Years Used 

 

                                         



                                         Never Smoker    

 

    



                                         Smokeless Tobacco: Never   



                                         Used   









                    Drinks/Week         oz/Week             Comments



                                         Alcohol Use   

 

                                                             



                                         Yes   









 



                           Sex Assigned at Birth     Date Recorded

 

 



                                         Not on file 









                                        Industry



                           Job Start Date            Occupation 

 

                                        Not on file



                           Not on file               Not on file 









                                        Travel End



                           Travel History            Travel Start 

 





                                         No recent travel history available.



documented as of this encounter



Last Filed Vital Signs

Not on filedocumented in this encounter



Plan of Treatment







                          Care Team                 Description



                     Date                Type                Specialty  

 

                                        



Ana Luisa Bautista MD



[address] [place] [address] [address]



[place] [address]



[phone] (Fax)                       



                     2020          Office Visit        Family Medicine  









   



                 Health Maintenance     Due Date        Last Done       Comments

 

   



                           DTaP,Tdap,and Td Vaccines     1960  



                                         (1 - Tdap)   

 

   



                           Zoster Recombinant        1991  



                                         Vaccine (SHINGRIX) (1 of   



                                         2)   

 

   



                           Medicare Wellness Visit     2006  

 

   



                           Osteoporosis Screening     2006  

 

   



                           PNEUMOCOCCAL VACCINES 65+     2006  



                                         (1 of 2 - PCV13)   

 

   



                           INFLUENZA VACCINE (#1)     2019  



documented as of this encounter



Results

Not on filedocumented in this encounter



Visit Diagnoses











                                         Diagnosis

 





                                         Restless leg syndrome



                                         Restless legs syndrome (RLS)



documented in this encounter



Insurance







                                        Type



            Payer      Benefit     Subscriber ID     Effective     Phone      Address 



                           Plan /                    Dates   



                                         Group     

 

                                        Medicare Adv [facility]     [facility]            [id number]       2019-P   



                     MANAGED MEDICARE     MEDICARE            resent   



                                         COMPLETE     



documented as of this encounter

## 2019-11-23 VITALS — SYSTOLIC BLOOD PRESSURE: 136 MMHG | DIASTOLIC BLOOD PRESSURE: 60 MMHG

## 2019-11-23 VITALS — SYSTOLIC BLOOD PRESSURE: 125 MMHG | DIASTOLIC BLOOD PRESSURE: 59 MMHG

## 2019-11-23 VITALS — DIASTOLIC BLOOD PRESSURE: 54 MMHG | SYSTOLIC BLOOD PRESSURE: 82 MMHG

## 2019-11-23 VITALS — DIASTOLIC BLOOD PRESSURE: 62 MMHG | SYSTOLIC BLOOD PRESSURE: 141 MMHG

## 2019-11-23 VITALS — DIASTOLIC BLOOD PRESSURE: 59 MMHG | SYSTOLIC BLOOD PRESSURE: 125 MMHG

## 2019-11-23 VITALS — SYSTOLIC BLOOD PRESSURE: 92 MMHG | DIASTOLIC BLOOD PRESSURE: 44 MMHG

## 2019-11-23 LAB
ANION GAP SERPL CALC-SCNC: 19.3 MMOL/L (ref 8–16)
BUN SERPL-MCNC: 25 MG/DL (ref 7–26)
BUN/CREAT SERPL: 19 (ref 6–25)
CALCIUM SERPL-MCNC: 8.1 MG/DL (ref 8.4–10.2)
CHLORIDE SERPL-SCNC: 98 MMOL/L (ref 98–107)
CHOLEST SERPL-MCNC: 224 MD/DL (ref 0–199)
CHOLEST/HDLC SERPL: 4.3 {RATIO} (ref 3–3.6)
CK MB SERPL-MCNC: 2.2 NG/ML (ref 0–5)
CK SERPL-CCNC: 155 IU/L (ref 29–168)
CO2 SERPL-SCNC: 28 MMOL/L (ref 22–29)
EGFRCR SERPLBLD CKD-EPI 2021: 39 ML/MIN (ref 60–?)
GLUCOSE SERPLBLD-MCNC: 96 MG/DL (ref 74–118)
HDLC SERPL-MSCNC: 52 MG/DL (ref 40–60)
LDLC SERPL CALC-MCNC: 136 MG/DL (ref 60–130)
MAGNESIUM SERPL-MCNC: 1.9 MG/DL (ref 1.3–2.1)
POTASSIUM SERPL-SCNC: 4.3 MMOL/L (ref 3.5–5.1)
SODIUM SERPL-SCNC: 141 MMOL/L (ref 136–145)
TRIGL SERPL-MCNC: 179 MG/DL (ref 0–149)

## 2019-11-23 RX ADMIN — ROPINIROLE PRN MG: 0.25 TABLET, FILM COATED ORAL at 08:16

## 2019-11-23 RX ADMIN — LEVOTHYROXINE SODIUM SCH MCG: 0.11 TABLET ORAL at 05:38

## 2019-11-23 RX ADMIN — Medication PRN MG: at 21:21

## 2019-11-23 RX ADMIN — ISOSORBIDE MONONITRATE SCH MG: 30 TABLET, EXTENDED RELEASE ORAL at 08:15

## 2019-11-23 RX ADMIN — ASPIRIN 81 MG CHEWABLE TABLET SCH MG: 81 TABLET CHEWABLE at 08:14

## 2019-11-23 RX ADMIN — VALSARTAN SCH MG: 80 TABLET ORAL at 08:15

## 2019-11-23 RX ADMIN — ROPINIROLE PRN MG: 0.25 TABLET, FILM COATED ORAL at 21:21

## 2019-11-23 RX ADMIN — FAMOTIDINE SCH MG: 20 TABLET, FILM COATED ORAL at 21:00

## 2019-11-23 RX ADMIN — FUROSEMIDE SCH MG: 10 INJECTION, SOLUTION INTRAMUSCULAR; INTRAVENOUS at 08:14

## 2019-11-23 RX ADMIN — FAMOTIDINE SCH MG: 20 TABLET, FILM COATED ORAL at 08:15

## 2019-11-23 RX ADMIN — PANTOPRAZOLE SODIUM SCH MG: 40 TABLET, DELAYED RELEASE ORAL at 08:08

## 2019-11-23 RX ADMIN — Medication SCH MEQ: at 08:15

## 2019-11-23 RX ADMIN — METOPROLOL SUCCINATE SCH MG: 25 TABLET, EXTENDED RELEASE ORAL at 08:15

## 2019-11-23 RX ADMIN — FUROSEMIDE SCH MG: 10 INJECTION, SOLUTION INTRAMUSCULAR; INTRAVENOUS at 21:00

## 2019-11-23 NOTE — PROGRESS NOTE
DATE:  11/23/2019

 

Cardiology Progress Note 

 

SUBJECTIVE:  Orthopnea has improved.  Denies any other complaints except for an episode

of back discomfort, which worsens with movement of upper extremities and is unrelated to

exertion.  She has been able to ambulate without any associated complaints. 

 

OBJECTIVE:  VITAL SIGNS:  Temperature 95.3, heart rate 63, blood pressure 82 to 110 over

54 to 110, respiratory rate 20, O2 saturation 98%.  BMI 40. 

GENERAL:  No acute distress, alert. 

NECK:  No JVD. 

CHEST:  Clear to auscultation. 

CARDIOVASCULAR:  Regular rate and rhythm.  Normal S1 and S2. 

ABDOMEN:  Soft, nontender.  Bowel sounds positive. 

EXTREMITIES:  Trace edema.

 

CARDIOVASCULAR MEDICATIONS:  Reviewed.  Metoprolol succinate 25 mg daily, furosemide 40

mg every 12 hours, valsartan 40 mg daily, aspirin 81 mg daily. 

 

STUDIES:  Reviewed.  Sodium 141, potassium 4.3, chloride 98, bicarbonate 28, BUN 25,

creatinine 1.3, glucose 96.  White blood cells 8.4, hemoglobin 12.5, and platelets 286.

INR 0.8.  PT 12, PTT 35.4.  AST 23, ALT 17, alkaline phosphatase 78, total bilirubin

0.4. 

 

ASSESSMENT AND PLAN:  A 79-year-old woman presents with acute-on-chronic heart failure,

morbid obesity, hypertension, and history of coronary artery disease with previous

bypass, and venous insufficiency. 

 

RECOMMENDATIONS:  Continue current medications with the following change.  Discontinue

isosorbide and consider transition to p.o. diuretics as of tomorrow.  Outpatient

followup with Cardiology within 1 week post discharge.  Low-sodium diet encouraged. 

 

 

 

 

______________________________

Juliano Stewart MD

 

AFRAMESH/MODL

D:  11/23/2019 23:07:05

T:  11/23/2019 23:19:20

Job #:  [id number]

## 2019-11-23 NOTE — HISTORY AND PHYSICAL
PRIMARY CARE DOCTOR:  Dr. Ana Luisa Bautista.

HOSPITAL DOCTOR:  Dr. Derrick Haynes



REASON FOR ER VISIT:  Chest pain.

 

HISTORY OF PRESENT ILLNESS:  Ms. Hewitt is a pleasant 79-year-old female with 
chest

pain.  The patient with previous cardiac history including January 2014, non ST

elevation MI.  The patient had left heart catheterization at time showing 
significant

coronary artery disease, LVEF 55%.  At that time.  The patient was recommended 
for CABG.

 She tells me two heart catheterizations showing significant coronary artery 
disease.

The patient went for two vessel CABG at that time she tells me, which was done

successfully.  The patient has been having a good level of function since.  
However,

they see the patient started to get chest tightness.  The patient underwent 
Lexiscan

stress test earlier this year, which she reports was totally normal.  At this 
time, the

patient was left for conservative followup on medicines.  However, the patient 
is having

resurgence of chest symptoms with pattern as significantly worsened.  At this 
time, the

patient comes to the emergency room.  Of note, troponins were 0.059.  The 
patient was

recommended for further evaluation and assessment. 

 

PAST MEDICAL HISTORY:  Hypertension, thyroidectomy secondary to thyroid cancer, 
daily

GERD. 

 

MEDICATIONS:  Medication list reviewed per the chart record.

 

ALLERGIES:  NO KNOWN DRUG ALLERGIES.

 

SOCIAL HISTORY:  No alcohol.  No drugs.  No smoking. The patient lives with 
daughter

with cerebral palsy who is 48 years of age, who is wheelchair bound and cannot 
transfer

by herself.  She has four local sons.  She is mostly homemaker. 

 

FAMILY HISTORY:  Noncontributory.

 

REVIEW OF SYSTEMS:

GENERAL:  No weight changes.   

OPHTHALMOLOGIC:  No vision changes. 

ENT:  No mouth ulcers.   

PULMONARY:  Mild wheezing rare. 

CARDIAC:  No recent heart attacks. 

GI:  No constipation. 

:  No blood in urine. 

NEUROLOGIC:  No seizures. 

IMMUNOLOGIC:  No lupus. 

DERMATOLOGIC:  No rash.

 

OBJECTIVE:  VITAL SIGNS:  Afebrile, vital signs noted reviewed per the chart 
record. 

GENERAL:  In no acute distress.  Alert and calm. 

HEENT:  Normocephalic atraumatic. 

NECK:  Supple.  Throat midline. 

LUNGS:  Bilateral air entry, rare rhonchi. 

CARDIOVASCULAR:  S1, S2.  No murmurs, rubs, or gallops.  ABDOMEN: Soft, 
nontender. 

EXTREMITIES:  No clubbing, no cyanosis.  There is no edema. 

INTEGUMENT:  No rash or purpura.

 

LABORATORY DATA:  Labs reviewed per the chart record.  .  Creatinine is 
1.17.

Hematocrit 38. 

Chest x-ray clear.

 

IMPRESSION/PLAN:  

1. Chest pain, treat as unstable angina, acute coronary syndrome, non ST 
elevation MI.

2. Known coronary artery disease status post CABG 2014.

3. Hypertension.

4. History of thyroidectomy due to thyroid cancer.

5. Daily GERD. 

 

Admit to [facility] telemetry.  Cardiology consult.  
Consider

heart catheterization invasive testing.  We will consider bronchodilators as 
well if

patient feels like she needs help expectorating.  To use cardiac medication at 
this

time. 

 

Thank you very much, Dr. Bautista for allowing Dr. Haynes and I had the chance 
to

participate in the care of your patient.  Please call for questions. 

 

 

 

 

______________________________

MD MARY LOU Kraft/JASBIR

D:  11/22/2019 21:46:41

T:  11/23/2019 00:31:18

Job #:  [id number]

 

MTDD

## 2019-11-23 NOTE — NUR
MEDICINE COVERAGE



DATE:  11/23/19



PRIMARY CARE DOCTOR:  Dr. Ana Luisa Bautista

[facility] MD:  Derrick Haynes

 

SUBJECTIVE:

no chest pain today

BP dropped however, medications adjusted

patient remains on diuresis

decreased leg edema

  

REVIEW OF SYSTEMS:

NO HEADACHES, no bleeding

 

OBJECTIVE:  

VITAL SIGNS:   vital signs noted reviewed per the chart record. 

GENERAL: no acute distress.  Alert and calm. 

HEENT:  Normocephalic atraumatic. 

NECK:  Supple.  Throat midline. 

LUNGS:  Bilateral air entry, rare rhonchi. 

CARDIOVASCULAR:  S1, S2.  No murmurs, rubs, or gallops.  

ABDOMEN: Soft, nontender. 

EXTREMITIES:  No clubbing, no cyanosis.   trace edema. 

INTEGUMENT:  No rash or purpura.

 

LABORATORY DATA:  k 4.3,  cr 1.31.  wbc 8.4, hct 38.  plt 286



 

IMPRESSION/PLAN:  

1. Chest tightness, fluid overload.  Better

2. Known coronary artery disease s/p CABG 2014.

3. Hypertension.

4. Hx thyroidectomy due to thyroid cancer.

5. Daily GERD. 

 

Continue telemetry

Follow with cardiology

Diuresis, medical management

rx GERD 

 

Thank you very much, Dr. Bautista for allowing Dr. Haynes and I had the chance 
to

participate in the care of your patient.  Please call for questions.

## 2019-11-24 VITALS — SYSTOLIC BLOOD PRESSURE: 118 MMHG | DIASTOLIC BLOOD PRESSURE: 54 MMHG

## 2019-11-24 VITALS — DIASTOLIC BLOOD PRESSURE: 58 MMHG | SYSTOLIC BLOOD PRESSURE: 120 MMHG

## 2019-11-24 VITALS — DIASTOLIC BLOOD PRESSURE: 55 MMHG | SYSTOLIC BLOOD PRESSURE: 94 MMHG

## 2019-11-24 VITALS — SYSTOLIC BLOOD PRESSURE: 115 MMHG | DIASTOLIC BLOOD PRESSURE: 57 MMHG

## 2019-11-24 VITALS — DIASTOLIC BLOOD PRESSURE: 57 MMHG | SYSTOLIC BLOOD PRESSURE: 115 MMHG

## 2019-11-24 RX ADMIN — VALSARTAN SCH MG: 80 TABLET ORAL at 09:00

## 2019-11-24 RX ADMIN — FAMOTIDINE SCH MG: 20 TABLET, FILM COATED ORAL at 09:00

## 2019-11-24 RX ADMIN — METOPROLOL SUCCINATE SCH MG: 25 TABLET, EXTENDED RELEASE ORAL at 09:00

## 2019-11-24 RX ADMIN — LEVOTHYROXINE SODIUM SCH MCG: 0.11 TABLET ORAL at 05:52

## 2019-11-24 RX ADMIN — Medication SCH MEQ: at 09:00

## 2019-11-24 RX ADMIN — PANTOPRAZOLE SODIUM SCH MG: 40 TABLET, DELAYED RELEASE ORAL at 09:00

## 2019-11-24 RX ADMIN — ASPIRIN 81 MG CHEWABLE TABLET SCH MG: 81 TABLET CHEWABLE at 09:00

## 2019-11-24 RX ADMIN — FUROSEMIDE SCH MG: 10 INJECTION, SOLUTION INTRAMUSCULAR; INTRAVENOUS at 09:00

## 2019-11-24 NOTE — NUR
pt discharged home, iv dcd withut redness or swelling.transported to [facility] via w/c

## 2019-11-25 NOTE — DISCHARGE SUMMARY
PRIMARY CARE DOCTOR:  Dr. Ana Luisa Bautista.

 

HOSPITAL PHYSICIAN:  The patient's hospital physician, Dr. Derrick Haynes. 

 

This covers for Dr. Haynes.

 

PRIMARY DIAGNOSIS:  Chest tightness, diastolic congestive heart failure.

 

SECONDARY DIAGNOSES:  Include coronary artery disease, status post CABG in 2014,

hypertension, history of thyroidectomy due to thyroid cancer, daily gastroesophageal

reflux disease. 

 

HOSPITAL COURSE:  Ms. Hewitt was admitted.  She ruled out for MI on after having

concerning symptoms.  Her troponins were at 0.065 maximum.  CK-MB level peaked at 3.0.

The patient was having leg edema and this was treated with Lasix.  Her symptoms got

better including chest tightness.  Knowing that she has an outside cardiologist, I was

elected to discharge her for outpatient followups cardiologist.  Her nitrates were

decreased due to hypotension.  Transient Lasix was reverted back to her home dose and

she was notified that this may or may not need to be titrated upwards.  She needs to

take low salt intake. 

 

DIET AT DISCHARGE:  Diet low salt, cardiac diet.

 

ACTIVITY:  As tolerated.

 

FOLLOW UP:  With Dr. Bautista and Dr. Cartwright.

 

MEDICATIONS AT DISCHARGE:  See medication list for details. 

 

Greater than 30 minutes spent in discharge planning and coordination on date.

 

 

 

 

______________________________

MD MARY LOU Kraft/JASBIR

D:  11/25/2019 02:18:04

T:  11/25/2019 05:26:10

Job #:  [id number]

## 2023-02-22 ENCOUNTER — HOSPITAL ENCOUNTER (EMERGENCY)
Dept: HOSPITAL 88 - ER | Age: 83
Discharge: HOME | End: 2023-02-22
Payer: MEDICARE

## 2023-02-22 VITALS — SYSTOLIC BLOOD PRESSURE: 137 MMHG | DIASTOLIC BLOOD PRESSURE: 84 MMHG

## 2023-02-22 VITALS — HEIGHT: 63 IN | WEIGHT: 227 LBS | BODY MASS INDEX: 40.22 KG/M2

## 2023-02-22 DIAGNOSIS — K21.9: ICD-10-CM

## 2023-02-22 DIAGNOSIS — R07.9: ICD-10-CM

## 2023-02-22 DIAGNOSIS — M54.9: ICD-10-CM

## 2023-02-22 DIAGNOSIS — Z95.1: ICD-10-CM

## 2023-02-22 DIAGNOSIS — E03.9: ICD-10-CM

## 2023-02-22 DIAGNOSIS — I50.9: ICD-10-CM

## 2023-02-22 DIAGNOSIS — G89.29: ICD-10-CM

## 2023-02-22 DIAGNOSIS — R06.02: Primary | ICD-10-CM

## 2023-02-22 DIAGNOSIS — I10: ICD-10-CM

## 2023-02-22 LAB
ALBUMIN SERPL-MCNC: 3.6 G/DL (ref 3.5–5)
ALBUMIN/GLOB SERPL: 1.1 {RATIO} (ref 0.8–2)
ALP SERPL-CCNC: 54 IU/L (ref 40–150)
ALT SERPL-CCNC: 21 IU/L (ref 0–55)
ANION GAP SERPL CALC-SCNC: 15.9 MMOL/L (ref 8–16)
BACTERIA URNS QL MICRO: (no result) /HPF
BASOPHILS # BLD AUTO: 0.1 10*3/UL (ref 0–0.1)
BASOPHILS NFR BLD AUTO: 1.1 % (ref 0–1)
BUN SERPL-MCNC: 27 MG/DL (ref 7–26)
BUN/CREAT SERPL: 18 (ref 6–25)
CALCIUM SERPL-MCNC: 8.6 MG/DL (ref 8.4–10.2)
CHLORIDE SERPL-SCNC: 99 MMOL/L (ref 98–107)
CLARITY UR: CLEAR
CO2 SERPL-SCNC: 33 MMOL/L (ref 22–29)
COLOR UR: YELLOW
DEPRECATED NEUTROPHILS # BLD AUTO: 4 10*3/UL (ref 2.1–6.9)
DEPRECATED RBC URNS MANUAL-ACNC: (no result) /HPF (ref 0–5)
EOSINOPHIL # BLD AUTO: 0.3 10*3/UL (ref 0–0.4)
EOSINOPHIL NFR BLD AUTO: 3.9 % (ref 0–6)
EPI CELLS URNS QL MICRO: (no result) /LPF
ERYTHROCYTE [DISTWIDTH] IN CORD BLOOD: 13.2 % (ref 11.7–14.4)
GLOBULIN PLAS-MCNC: 3.3 G/DL (ref 2.3–3.5)
GLUCOSE SERPLBLD-MCNC: 98 MG/DL (ref 74–118)
HCT VFR BLD AUTO: 35.6 % (ref 34.2–44.1)
HGB BLD-MCNC: 11.8 G/DL (ref 12–16)
KETONES UR QL STRIP.AUTO: NEGATIVE
LEUKOCYTE ESTERASE UR QL STRIP.AUTO: NEGATIVE
LYMPHOCYTES # BLD: 1.5 10*3/UL (ref 1–3.2)
LYMPHOCYTES NFR BLD AUTO: 23.2 % (ref 18–39.1)
MCH RBC QN AUTO: 31.1 PG (ref 28–32)
MCHC RBC AUTO-ENTMCNC: 33.1 G/DL (ref 31–35)
MCV RBC AUTO: 93.7 FL (ref 81–99)
MONOCYTES # BLD AUTO: 0.7 10*3/UL (ref 0.2–0.8)
MONOCYTES NFR BLD AUTO: 10.9 % (ref 4.4–11.3)
NEUTS SEG NFR BLD AUTO: 60.6 % (ref 38.7–80)
NITRITE UR QL STRIP.AUTO: NEGATIVE
PLATELET # BLD AUTO: 222 X10E3/UL (ref 140–360)
POTASSIUM SERPL-SCNC: 3.9 MMOL/L (ref 3.5–5.1)
PROT UR QL STRIP.AUTO: NEGATIVE
RBC # BLD AUTO: 3.8 X10E6/UL (ref 3.6–5.1)
SODIUM SERPL-SCNC: 144 MMOL/L (ref 136–145)
SP GR UR STRIP: 1.01 (ref 1.01–1.02)
UROBILINOGEN UR STRIP-MCNC: 0.2 MG/DL (ref 0.2–1)
WBC #/AREA URNS HPF: (no result) /HPF (ref 0–5)

## 2023-02-22 PROCEDURE — 85025 COMPLETE CBC W/AUTO DIFF WBC: CPT

## 2023-02-22 PROCEDURE — 71046 X-RAY EXAM CHEST 2 VIEWS: CPT

## 2023-02-22 PROCEDURE — 84484 ASSAY OF TROPONIN QUANT: CPT

## 2023-02-22 PROCEDURE — 80053 COMPREHEN METABOLIC PANEL: CPT

## 2023-02-22 PROCEDURE — 81001 URINALYSIS AUTO W/SCOPE: CPT

## 2023-02-22 PROCEDURE — 36415 COLL VENOUS BLD VENIPUNCTURE: CPT

## 2023-02-22 PROCEDURE — 93005 ELECTROCARDIOGRAM TRACING: CPT

## 2023-02-22 PROCEDURE — 99284 EMERGENCY DEPT VISIT MOD MDM: CPT

## 2023-02-22 PROCEDURE — 83880 ASSAY OF NATRIURETIC PEPTIDE: CPT

## 2023-03-25 ENCOUNTER — HOSPITAL ENCOUNTER (EMERGENCY)
Dept: HOSPITAL 88 - ER | Age: 83
LOS: 1 days | Discharge: HOME | End: 2023-03-26
Payer: MEDICARE

## 2023-03-25 VITALS — WEIGHT: 227 LBS | HEIGHT: 63 IN | BODY MASS INDEX: 40.22 KG/M2

## 2023-03-25 DIAGNOSIS — M54.9: ICD-10-CM

## 2023-03-25 DIAGNOSIS — I50.9: ICD-10-CM

## 2023-03-25 DIAGNOSIS — G89.29: ICD-10-CM

## 2023-03-25 DIAGNOSIS — K21.9: ICD-10-CM

## 2023-03-25 DIAGNOSIS — Z95.1: ICD-10-CM

## 2023-03-25 DIAGNOSIS — M79.672: ICD-10-CM

## 2023-03-25 DIAGNOSIS — R60.9: Primary | ICD-10-CM

## 2023-03-25 DIAGNOSIS — I10: ICD-10-CM

## 2023-03-25 DIAGNOSIS — E03.9: ICD-10-CM

## 2023-03-25 LAB
BACTERIA URNS QL MICRO: (no result) /HPF
CLARITY UR: CLEAR
COLOR UR: YELLOW
DEPRECATED RBC URNS MANUAL-ACNC: (no result) /HPF (ref 0–5)
EPI CELLS URNS QL MICRO: (no result) /LPF
KETONES UR QL STRIP.AUTO: NEGATIVE
LEUKOCYTE ESTERASE UR QL STRIP.AUTO: NEGATIVE
NITRITE UR QL STRIP.AUTO: NEGATIVE
PROT UR QL STRIP.AUTO: NEGATIVE
SP GR UR STRIP: 1.01 (ref 1.01–1.02)
UROBILINOGEN UR STRIP-MCNC: 0.2 MG/DL (ref 0.2–1)
WBC #/AREA URNS HPF: (no result) /HPF (ref 0–5)

## 2023-03-25 PROCEDURE — 99283 EMERGENCY DEPT VISIT LOW MDM: CPT

## 2023-03-25 PROCEDURE — 81001 URINALYSIS AUTO W/SCOPE: CPT

## 2023-03-25 PROCEDURE — 93971 EXTREMITY STUDY: CPT

## 2023-10-20 ENCOUNTER — HOSPITAL ENCOUNTER (INPATIENT)
Dept: HOSPITAL 88 - ER | Age: 83
LOS: 6 days | Discharge: HOME | DRG: 871 | End: 2023-10-26
Attending: INTERNAL MEDICINE | Admitting: INTERNAL MEDICINE
Payer: MEDICARE

## 2023-10-20 VITALS — HEIGHT: 62 IN | WEIGHT: 215 LBS | BODY MASS INDEX: 39.56 KG/M2

## 2023-10-20 VITALS
SYSTOLIC BLOOD PRESSURE: 129 MMHG | TEMPERATURE: 98.4 F | OXYGEN SATURATION: 96 % | RESPIRATION RATE: 21 BRPM | DIASTOLIC BLOOD PRESSURE: 67 MMHG | HEART RATE: 84 BPM

## 2023-10-20 VITALS
HEART RATE: 72 BPM | DIASTOLIC BLOOD PRESSURE: 65 MMHG | TEMPERATURE: 98.5 F | SYSTOLIC BLOOD PRESSURE: 123 MMHG | OXYGEN SATURATION: 97 % | RESPIRATION RATE: 18 BRPM

## 2023-10-20 DIAGNOSIS — E66.01: ICD-10-CM
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
## 2023-10-20 DIAGNOSIS — Z79.890: ICD-10-CM

## 2023-10-20 DIAGNOSIS — G89.29: ICD-10-CM

## 2023-10-20 DIAGNOSIS — K57.90: ICD-10-CM

## 2023-10-20 DIAGNOSIS — K21.9: ICD-10-CM

## 2023-10-20 DIAGNOSIS — I48.20: ICD-10-CM

## 2023-10-20 DIAGNOSIS — Z79.01: ICD-10-CM

## 2023-10-20 DIAGNOSIS — N18.30: ICD-10-CM

## 2023-10-20 DIAGNOSIS — J96.01: ICD-10-CM

## 2023-10-20 DIAGNOSIS — J12.82: ICD-10-CM

## 2023-10-20 DIAGNOSIS — R53.1: ICD-10-CM

## 2023-10-20 DIAGNOSIS — R53.81: ICD-10-CM

## 2023-10-20 DIAGNOSIS — I50.33: ICD-10-CM

## 2023-10-20 DIAGNOSIS — N17.9: ICD-10-CM

## 2023-10-20 DIAGNOSIS — J18.8: ICD-10-CM

## 2023-10-20 DIAGNOSIS — R59.0: ICD-10-CM

## 2023-10-20 DIAGNOSIS — Z79.899: ICD-10-CM

## 2023-10-20 DIAGNOSIS — E03.9: ICD-10-CM

## 2023-10-20 DIAGNOSIS — Z95.1: ICD-10-CM

## 2023-10-20 DIAGNOSIS — Z79.82: ICD-10-CM

## 2023-10-20 DIAGNOSIS — I25.10: ICD-10-CM

## 2023-10-20 DIAGNOSIS — U07.1: ICD-10-CM

## 2023-10-20 DIAGNOSIS — A41.9: Primary | ICD-10-CM

## 2023-10-20 DIAGNOSIS — I13.0: ICD-10-CM
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
## 2023-10-20 LAB
ANION GAP SERPL CALC-SCNC: 15.8 MMOL/L (ref 8–16)
BACTERIA URNS QL MICRO: (no result) /HPF
BASOPHILS # BLD AUTO: 0 10*3/UL (ref 0–0.1)
BASOPHILS NFR BLD AUTO: 0.3 % (ref 0–1)
BNP BLD-MCNC: 538.9 PG/ML (ref 0–100)
BUN SERPL-MCNC: 22 MG/DL (ref 7–26)
BUN/CREAT SERPL: 18 (ref 6–25)
CALCIUM SERPL-MCNC: 7.9 MG/DL (ref 8.4–10.2)
CHLORIDE SERPL-SCNC: 101 MMOL/L (ref 98–107)
CK SERPL-CCNC: 83 IU/L (ref 29–168)
CLARITY UR: CLEAR
CO2 SERPL-SCNC: 24 MMOL/L (ref 22–29)
COLOR UR: YELLOW
DEPRECATED APTT PLAS QN: 41.3 SECONDS (ref 23.8–35.5)
DEPRECATED INR PLAS: 1.43
DEPRECATED NEUTROPHILS # BLD AUTO: 10.8 10*3/UL (ref 2.1–6.9)
DEPRECATED RBC URNS MANUAL-ACNC: (no result) /HPF (ref 0–5)
EOSINOPHIL # BLD AUTO: 0 10*3/UL (ref 0–0.4)
EOSINOPHIL NFR BLD AUTO: 0 % (ref 0–6)
EPI CELLS URNS QL MICRO: (no result) /LPF
ERYTHROCYTE [DISTWIDTH] IN CORD BLOOD: 14 % (ref 11.7–14.4)
FLUAV + FLUBV AG SPEC IF: NEGATIVE
GLUCOSE SERPLBLD-MCNC: 120 MG/DL (ref 74–118)
HCT VFR BLD AUTO: 31.9 % (ref 34.2–44.1)
HGB BLD-MCNC: 10.7 G/DL (ref 12–16)
KETONES UR QL STRIP.AUTO: NEGATIVE
LEUKOCYTE ESTERASE UR QL STRIP.AUTO: NEGATIVE
LYMPHOCYTES # BLD: 1.1 10*3/UL (ref 1–3.2)
LYMPHOCYTES NFR BLD AUTO: 8.5 % (ref 18–39.1)
MCH RBC QN AUTO: 29.6 PG (ref 28–32)
MCHC RBC AUTO-ENTMCNC: 33.5 G/DL (ref 31–35)
MCV RBC AUTO: 88.4 FL (ref 81–99)
MONOCYTES # BLD AUTO: 1.4 10*3/UL (ref 0.2–0.8)
MONOCYTES NFR BLD AUTO: 10.3 % (ref 4.4–11.3)
NEUTS SEG NFR BLD AUTO: 80.5 % (ref 38.7–80)
NITRITE UR QL STRIP.AUTO: NEGATIVE
PLATELET # BLD AUTO: 220 X10E3/UL (ref 140–360)
POTASSIUM SERPL-SCNC: 3.8 MMOL/L (ref 3.5–5.1)
PROT UR QL STRIP.AUTO: (no result)
PROTHROMBIN TIME: 18.3 SECONDS (ref 11.9–14.5)
RBC # BLD AUTO: 3.61 X10E6/UL (ref 3.6–5.1)
S PYO AG THROAT QL: NEGATIVE
SODIUM SERPL-SCNC: 137 MMOL/L (ref 136–145)
SP GR UR STRIP: 1.02 (ref 1.01–1.02)
UROBILINOGEN UR STRIP-MCNC: 0.2 MG/DL (ref 0.2–1)
WBC # BLD: 13.45 X10E3/UL (ref 4.8–10.8)
WBC #/AREA URNS HPF: (no result) /HPF (ref 0–5)

## 2023-10-20 PROCEDURE — 93005 ELECTROCARDIOGRAM TRACING: CPT

## 2023-10-20 PROCEDURE — 87205 SMEAR GRAM STAIN: CPT

## 2023-10-20 PROCEDURE — 87040 BLOOD CULTURE FOR BACTERIA: CPT

## 2023-10-20 PROCEDURE — 83605 ASSAY OF LACTIC ACID: CPT

## 2023-10-20 PROCEDURE — 36415 COLL VENOUS BLD VENIPUNCTURE: CPT

## 2023-10-20 PROCEDURE — 72125 CT NECK SPINE W/O DYE: CPT

## 2023-10-20 PROCEDURE — 85730 THROMBOPLASTIN TIME PARTIAL: CPT

## 2023-10-20 PROCEDURE — 83518 IMMUNOASSAY DIPSTICK: CPT

## 2023-10-20 PROCEDURE — 85610 PROTHROMBIN TIME: CPT

## 2023-10-20 PROCEDURE — 82728 ASSAY OF FERRITIN: CPT

## 2023-10-20 PROCEDURE — 82550 ASSAY OF CK (CPK): CPT

## 2023-10-20 PROCEDURE — 94760 N-INVAS EAR/PLS OXIMETRY 1: CPT

## 2023-10-20 PROCEDURE — 94799 UNLISTED PULMONARY SVC/PX: CPT

## 2023-10-20 PROCEDURE — 83880 ASSAY OF NATRIURETIC PEPTIDE: CPT

## 2023-10-20 PROCEDURE — 83615 LACTATE (LD) (LDH) ENZYME: CPT

## 2023-10-20 PROCEDURE — 87070 CULTURE OTHR SPECIMN AEROBIC: CPT

## 2023-10-20 PROCEDURE — 93306 TTE W/DOPPLER COMPLETE: CPT

## 2023-10-20 PROCEDURE — 80048 BASIC METABOLIC PNL TOTAL CA: CPT

## 2023-10-20 PROCEDURE — 71045 X-RAY EXAM CHEST 1 VIEW: CPT

## 2023-10-20 PROCEDURE — 85025 COMPLETE CBC W/AUTO DIFF WBC: CPT

## 2023-10-20 PROCEDURE — 99285 EMERGENCY DEPT VISIT HI MDM: CPT

## 2023-10-20 PROCEDURE — 85007 BL SMEAR W/DIFF WBC COUNT: CPT

## 2023-10-20 PROCEDURE — 81001 URINALYSIS AUTO W/SCOPE: CPT

## 2023-10-20 PROCEDURE — 86140 C-REACTIVE PROTEIN: CPT

## 2023-10-20 PROCEDURE — 84484 ASSAY OF TROPONIN QUANT: CPT

## 2023-10-20 PROCEDURE — 85027 COMPLETE CBC AUTOMATED: CPT

## 2023-10-20 PROCEDURE — 71250 CT THORAX DX C-: CPT

## 2023-10-20 PROCEDURE — 87400 INFLUENZA A/B EACH AG IA: CPT

## 2023-10-20 PROCEDURE — 80053 COMPREHEN METABOLIC PANEL: CPT

## 2023-10-20 PROCEDURE — 70450 CT HEAD/BRAIN W/O DYE: CPT

## 2023-10-20 PROCEDURE — 87086 URINE CULTURE/COLONY COUNT: CPT

## 2023-10-21 VITALS
SYSTOLIC BLOOD PRESSURE: 104 MMHG | OXYGEN SATURATION: 99 % | TEMPERATURE: 99.7 F | RESPIRATION RATE: 16 BRPM | DIASTOLIC BLOOD PRESSURE: 54 MMHG | HEART RATE: 98 BPM

## 2023-10-21 VITALS
TEMPERATURE: 100.8 F | HEART RATE: 96 BPM | OXYGEN SATURATION: 94 % | SYSTOLIC BLOOD PRESSURE: 121 MMHG | DIASTOLIC BLOOD PRESSURE: 73 MMHG | RESPIRATION RATE: 21 BRPM

## 2023-10-21 VITALS
DIASTOLIC BLOOD PRESSURE: 53 MMHG | SYSTOLIC BLOOD PRESSURE: 104 MMHG | TEMPERATURE: 98.3 F | OXYGEN SATURATION: 95 % | HEART RATE: 94 BPM | RESPIRATION RATE: 24 BRPM

## 2023-10-21 VITALS
DIASTOLIC BLOOD PRESSURE: 56 MMHG | HEART RATE: 82 BPM | SYSTOLIC BLOOD PRESSURE: 123 MMHG | OXYGEN SATURATION: 98 % | TEMPERATURE: 97.7 F | RESPIRATION RATE: 21 BRPM

## 2023-10-21 VITALS
RESPIRATION RATE: 21 BRPM | TEMPERATURE: 102.4 F | DIASTOLIC BLOOD PRESSURE: 69 MMHG | OXYGEN SATURATION: 97 % | SYSTOLIC BLOOD PRESSURE: 142 MMHG | HEART RATE: 92 BPM

## 2023-10-21 LAB
CK SERPL-CCNC: 153 IU/L (ref 29–168)
CK SERPL-CCNC: 187 IU/L (ref 29–168)

## 2023-10-21 PROCEDURE — 3E0433Z INTRODUCTION OF ANTI-INFLAMMATORY INTO CENTRAL VEIN, PERCUTANEOUS APPROACH: ICD-10-PCS

## 2023-10-21 PROCEDURE — XW043E5 INTRODUCTION OF REMDESIVIR ANTI-INFECTIVE INTO CENTRAL VEIN, PERCUTANEOUS APPROACH, NEW TECHNOLOGY GROUP 5: ICD-10-PCS

## 2023-10-21 RX ADMIN — Medication PRN MG: at 04:47

## 2023-10-21 RX ADMIN — METOPROLOL SUCCINATE SCH MG: 25 TABLET, EXTENDED RELEASE ORAL at 09:00

## 2023-10-21 RX ADMIN — PANTOPRAZOLE SODIUM SCH MG: 40 TABLET, DELAYED RELEASE ORAL at 09:44

## 2023-10-21 RX ADMIN — ISOSORBIDE MONONITRATE SCH MG: 30 TABLET, EXTENDED RELEASE ORAL at 11:47

## 2023-10-21 RX ADMIN — SODIUM CHLORIDE SCH MG: 900 INJECTION INTRAVENOUS at 15:45

## 2023-10-21 RX ADMIN — Medication SCH MG: at 16:53

## 2023-10-21 RX ADMIN — APIXABAN SCH MG: 2.5 TABLET, FILM COATED ORAL at 20:32

## 2023-10-21 RX ADMIN — BARICITINIB SCH MG: 2 TABLET, FILM COATED ORAL at 15:44

## 2023-10-21 RX ADMIN — ATORVASTATIN CALCIUM SCH MG: 10 TABLET, FILM COATED ORAL at 09:43

## 2023-10-21 RX ADMIN — LEVOTHYROXINE SODIUM SCH MCG: 0.11 TABLET ORAL at 12:58

## 2023-10-21 RX ADMIN — ROPINIROLE SCH MG: 1 TABLET, FILM COATED ORAL at 20:32

## 2023-10-21 RX ADMIN — Medication SCH MG: at 20:32

## 2023-10-22 VITALS
DIASTOLIC BLOOD PRESSURE: 86 MMHG | TEMPERATURE: 97.8 F | SYSTOLIC BLOOD PRESSURE: 140 MMHG | HEART RATE: 65 BPM | OXYGEN SATURATION: 100 % | RESPIRATION RATE: 19 BRPM

## 2023-10-22 VITALS
SYSTOLIC BLOOD PRESSURE: 119 MMHG | DIASTOLIC BLOOD PRESSURE: 69 MMHG | HEART RATE: 76 BPM | TEMPERATURE: 97.5 F | OXYGEN SATURATION: 100 % | RESPIRATION RATE: 19 BRPM

## 2023-10-22 VITALS
HEART RATE: 79 BPM | RESPIRATION RATE: 19 BRPM | DIASTOLIC BLOOD PRESSURE: 58 MMHG | SYSTOLIC BLOOD PRESSURE: 118 MMHG | TEMPERATURE: 97.7 F | OXYGEN SATURATION: 100 %

## 2023-10-22 VITALS — OXYGEN SATURATION: 95 % | HEART RATE: 73 BPM | RESPIRATION RATE: 20 BRPM

## 2023-10-22 VITALS
DIASTOLIC BLOOD PRESSURE: 77 MMHG | SYSTOLIC BLOOD PRESSURE: 124 MMHG | RESPIRATION RATE: 18 BRPM | TEMPERATURE: 97.6 F | OXYGEN SATURATION: 98 % | HEART RATE: 60 BPM

## 2023-10-22 VITALS
RESPIRATION RATE: 22 BRPM | TEMPERATURE: 97.9 F | OXYGEN SATURATION: 95 % | SYSTOLIC BLOOD PRESSURE: 103 MMHG | HEART RATE: 86 BPM | DIASTOLIC BLOOD PRESSURE: 65 MMHG

## 2023-10-22 VITALS
HEART RATE: 65 BPM | OXYGEN SATURATION: 100 % | TEMPERATURE: 97.8 F | DIASTOLIC BLOOD PRESSURE: 86 MMHG | SYSTOLIC BLOOD PRESSURE: 140 MMHG | RESPIRATION RATE: 19 BRPM

## 2023-10-22 VITALS
DIASTOLIC BLOOD PRESSURE: 76 MMHG | HEART RATE: 65 BPM | TEMPERATURE: 97.6 F | RESPIRATION RATE: 20 BRPM | OXYGEN SATURATION: 100 % | SYSTOLIC BLOOD PRESSURE: 104 MMHG

## 2023-10-22 VITALS
HEART RATE: 92 BPM | SYSTOLIC BLOOD PRESSURE: 101 MMHG | TEMPERATURE: 97.5 F | DIASTOLIC BLOOD PRESSURE: 64 MMHG | RESPIRATION RATE: 19 BRPM

## 2023-10-22 LAB
ALBUMIN SERPL-MCNC: 2.5 G/DL (ref 3.5–5)
ALBUMIN/GLOB SERPL: 0.8 {RATIO} (ref 0.8–2)
ALP SERPL-CCNC: 71 IU/L (ref 40–150)
ALT SERPL-CCNC: 26 IU/L (ref 0–55)
ANION GAP SERPL CALC-SCNC: 13.9 MMOL/L (ref 8–16)
BASOPHILS # BLD AUTO: 0 10*3/UL (ref 0–0.1)
BASOPHILS NFR BLD AUTO: 0.2 % (ref 0–1)
BUN SERPL-MCNC: 27 MG/DL (ref 7–26)
BUN/CREAT SERPL: 23 (ref 6–25)
CALCIUM SERPL-MCNC: 7.6 MG/DL (ref 8.4–10.2)
CHLORIDE SERPL-SCNC: 101 MMOL/L (ref 98–107)
CO2 SERPL-SCNC: 24 MMOL/L (ref 22–29)
DEPRECATED NEUTROPHILS # BLD AUTO: 7.5 10*3/UL (ref 2.1–6.9)
EOSINOPHIL # BLD AUTO: 0 10*3/UL (ref 0–0.4)
EOSINOPHIL NFR BLD AUTO: 0 % (ref 0–6)
ERYTHROCYTE [DISTWIDTH] IN CORD BLOOD: 14.6 % (ref 11.7–14.4)
FERRITIN SERPL-MCNC: 216.58 NG/ML (ref 4.63–204)
GLOBULIN PLAS-MCNC: 3.3 G/DL (ref 2.3–3.5)
GLUCOSE SERPLBLD-MCNC: 147 MG/DL (ref 74–118)
HCT VFR BLD AUTO: 27.5 % (ref 34.2–44.1)
HGB BLD-MCNC: 9.8 G/DL (ref 12–16)
LDH SERPL-CCNC: 261 IU/L (ref 125–220)
LYMPHOCYTES # BLD: 0.7 10*3/UL (ref 1–3.2)
LYMPHOCYTES NFR BLD AUTO: 8.2 % (ref 18–39.1)
MCH RBC QN AUTO: 31.9 PG (ref 28–32)
MCHC RBC AUTO-ENTMCNC: 35.6 G/DL (ref 31–35)
MCV RBC AUTO: 89.6 FL (ref 81–99)
MONOCYTES # BLD AUTO: 0.4 10*3/UL (ref 0.2–0.8)
MONOCYTES NFR BLD AUTO: 4.5 % (ref 4.4–11.3)
NEUTS SEG NFR BLD AUTO: 86.6 % (ref 38.7–80)
PLATELET # BLD AUTO: 160 X10E3/UL (ref 140–360)
POTASSIUM SERPL-SCNC: 3.9 MMOL/L (ref 3.5–5.1)
RBC # BLD AUTO: 3.07 X10E6/UL (ref 3.6–5.1)
SODIUM SERPL-SCNC: 135 MMOL/L (ref 136–145)
WBC # BLD: 8.69 X10E3/UL (ref 4.8–10.8)

## 2023-10-22 RX ADMIN — METOPROLOL SUCCINATE SCH MG: 25 TABLET, EXTENDED RELEASE ORAL at 09:05

## 2023-10-22 RX ADMIN — ISOSORBIDE MONONITRATE SCH MG: 30 TABLET, EXTENDED RELEASE ORAL at 09:04

## 2023-10-22 RX ADMIN — APIXABAN SCH MG: 2.5 TABLET, FILM COATED ORAL at 09:05

## 2023-10-22 RX ADMIN — Medication SCH MG: at 20:42

## 2023-10-22 RX ADMIN — LEVOTHYROXINE SODIUM SCH MCG: 0.11 TABLET ORAL at 05:37

## 2023-10-22 RX ADMIN — ATORVASTATIN CALCIUM SCH MG: 10 TABLET, FILM COATED ORAL at 09:06

## 2023-10-22 RX ADMIN — BARICITINIB SCH MG: 2 TABLET, FILM COATED ORAL at 17:01

## 2023-10-22 RX ADMIN — SODIUM CHLORIDE SCH MLS/HR: 9 INJECTION, SOLUTION INTRAVENOUS at 16:57

## 2023-10-22 RX ADMIN — CALCITRIOL SCH MCG: 0.25 CAPSULE, GELATIN COATED ORAL at 09:06

## 2023-10-22 RX ADMIN — SODIUM CHLORIDE SCH MG: 900 INJECTION INTRAVENOUS at 17:02

## 2023-10-22 RX ADMIN — Medication SCH MG: at 17:01

## 2023-10-22 RX ADMIN — ROPINIROLE SCH MG: 1 TABLET, FILM COATED ORAL at 20:42

## 2023-10-22 RX ADMIN — PANTOPRAZOLE SODIUM SCH MG: 40 TABLET, DELAYED RELEASE ORAL at 09:06

## 2023-10-22 RX ADMIN — APIXABAN SCH MG: 2.5 TABLET, FILM COATED ORAL at 20:40

## 2023-10-23 VITALS
OXYGEN SATURATION: 100 % | TEMPERATURE: 97.7 F | DIASTOLIC BLOOD PRESSURE: 76 MMHG | SYSTOLIC BLOOD PRESSURE: 124 MMHG | HEART RATE: 86 BPM | RESPIRATION RATE: 16 BRPM

## 2023-10-23 VITALS
HEART RATE: 65 BPM | RESPIRATION RATE: 16 BRPM | OXYGEN SATURATION: 100 % | TEMPERATURE: 97.7 F | SYSTOLIC BLOOD PRESSURE: 126 MMHG | DIASTOLIC BLOOD PRESSURE: 69 MMHG

## 2023-10-23 VITALS
DIASTOLIC BLOOD PRESSURE: 69 MMHG | RESPIRATION RATE: 16 BRPM | TEMPERATURE: 97.7 F | HEART RATE: 65 BPM | SYSTOLIC BLOOD PRESSURE: 126 MMHG | OXYGEN SATURATION: 100 %

## 2023-10-23 VITALS — RESPIRATION RATE: 20 BRPM | OXYGEN SATURATION: 96 % | HEART RATE: 78 BPM

## 2023-10-23 VITALS — HEART RATE: 66 BPM | OXYGEN SATURATION: 97 % | RESPIRATION RATE: 18 BRPM

## 2023-10-23 VITALS
DIASTOLIC BLOOD PRESSURE: 83 MMHG | HEART RATE: 63 BPM | OXYGEN SATURATION: 97 % | RESPIRATION RATE: 16 BRPM | SYSTOLIC BLOOD PRESSURE: 133 MMHG | TEMPERATURE: 98 F

## 2023-10-23 VITALS — RESPIRATION RATE: 18 BRPM | OXYGEN SATURATION: 100 % | HEART RATE: 60 BPM

## 2023-10-23 VITALS
OXYGEN SATURATION: 98 % | DIASTOLIC BLOOD PRESSURE: 71 MMHG | HEART RATE: 64 BPM | RESPIRATION RATE: 17 BRPM | TEMPERATURE: 98.1 F | SYSTOLIC BLOOD PRESSURE: 156 MMHG

## 2023-10-23 VITALS — RESPIRATION RATE: 18 BRPM | HEART RATE: 68 BPM | OXYGEN SATURATION: 98 %

## 2023-10-23 LAB
ANION GAP SERPL CALC-SCNC: 14.4 MMOL/L (ref 8–16)
BASOPHILS # BLD AUTO: 0 10*3/UL (ref 0–0.1)
BASOPHILS NFR BLD AUTO: 0.2 % (ref 0–1)
BUN SERPL-MCNC: 39 MG/DL (ref 7–26)
BUN/CREAT SERPL: 33 (ref 6–25)
CALCIUM SERPL-MCNC: 7.6 MG/DL (ref 8.4–10.2)
CHLORIDE SERPL-SCNC: 102 MMOL/L (ref 98–107)
CK SERPL-CCNC: 94 IU/L (ref 29–168)
CO2 SERPL-SCNC: 25 MMOL/L (ref 22–29)
DEPRECATED NEUTROPHILS # BLD AUTO: 6.9 10*3/UL (ref 2.1–6.9)
EOSINOPHIL # BLD AUTO: 0 10*3/UL (ref 0–0.4)
EOSINOPHIL NFR BLD AUTO: 0 % (ref 0–6)
ERYTHROCYTE [DISTWIDTH] IN CORD BLOOD: 14 % (ref 11.7–14.4)
GLUCOSE SERPLBLD-MCNC: 160 MG/DL (ref 74–118)
HCT VFR BLD AUTO: 28.2 % (ref 34.2–44.1)
HGB BLD-MCNC: 9.3 G/DL (ref 12–16)
LYMPHOCYTES # BLD: 1.1 10*3/UL (ref 1–3.2)
LYMPHOCYTES NFR BLD AUTO: 12.5 % (ref 18–39.1)
MCH RBC QN AUTO: 28.9 PG (ref 28–32)
MCHC RBC AUTO-ENTMCNC: 33 G/DL (ref 31–35)
MCV RBC AUTO: 87.6 FL (ref 81–99)
MONOCYTES # BLD AUTO: 0.4 10*3/UL (ref 0.2–0.8)
MONOCYTES NFR BLD AUTO: 5.2 % (ref 4.4–11.3)
NEUTS SEG NFR BLD AUTO: 81.3 % (ref 38.7–80)
PLATELET # BLD AUTO: 225 X10E3/UL (ref 140–360)
POTASSIUM SERPL-SCNC: 4.4 MMOL/L (ref 3.5–5.1)
RBC # BLD AUTO: 3.22 X10E6/UL (ref 3.6–5.1)
SODIUM SERPL-SCNC: 137 MMOL/L (ref 136–145)
WBC # BLD: 8.45 X10E3/UL (ref 4.8–10.8)

## 2023-10-23 PROCEDURE — 3E04329 INTRODUCTION OF OTHER ANTI-INFECTIVE INTO CENTRAL VEIN, PERCUTANEOUS APPROACH: ICD-10-PCS | Performed by: INTERNAL MEDICINE

## 2023-10-23 RX ADMIN — PANTOPRAZOLE SODIUM SCH MG: 40 TABLET, DELAYED RELEASE ORAL at 10:06

## 2023-10-23 RX ADMIN — METOPROLOL SUCCINATE SCH MG: 25 TABLET, EXTENDED RELEASE ORAL at 10:04

## 2023-10-23 RX ADMIN — BARICITINIB SCH MG: 2 TABLET, FILM COATED ORAL at 18:26

## 2023-10-23 RX ADMIN — LEVOTHYROXINE SODIUM SCH MCG: 0.11 TABLET ORAL at 05:31

## 2023-10-23 RX ADMIN — ATORVASTATIN CALCIUM SCH MG: 10 TABLET, FILM COATED ORAL at 10:06

## 2023-10-23 RX ADMIN — ROPINIROLE SCH MG: 1 TABLET, FILM COATED ORAL at 20:17

## 2023-10-23 RX ADMIN — SODIUM CHLORIDE SCH MLS/HR: 9 INJECTION, SOLUTION INTRAVENOUS at 18:26

## 2023-10-23 RX ADMIN — CALCITRIOL SCH MCG: 0.25 CAPSULE, GELATIN COATED ORAL at 10:03

## 2023-10-23 RX ADMIN — SODIUM CHLORIDE SCH MG: 900 INJECTION INTRAVENOUS at 18:26

## 2023-10-23 RX ADMIN — APIXABAN SCH MG: 2.5 TABLET, FILM COATED ORAL at 10:04

## 2023-10-23 RX ADMIN — ISOSORBIDE MONONITRATE SCH MG: 30 TABLET, EXTENDED RELEASE ORAL at 10:05

## 2023-10-23 RX ADMIN — Medication SCH MG: at 20:17

## 2023-10-23 RX ADMIN — Medication SCH MG: at 18:26

## 2023-10-23 RX ADMIN — APIXABAN SCH MG: 2.5 TABLET, FILM COATED ORAL at 20:17

## 2023-10-24 VITALS
SYSTOLIC BLOOD PRESSURE: 147 MMHG | OXYGEN SATURATION: 100 % | RESPIRATION RATE: 20 BRPM | HEART RATE: 62 BPM | DIASTOLIC BLOOD PRESSURE: 70 MMHG | TEMPERATURE: 98 F

## 2023-10-24 VITALS
TEMPERATURE: 97.6 F | RESPIRATION RATE: 20 BRPM | HEART RATE: 52 BPM | OXYGEN SATURATION: 100 % | SYSTOLIC BLOOD PRESSURE: 140 MMHG | DIASTOLIC BLOOD PRESSURE: 75 MMHG

## 2023-10-24 VITALS — OXYGEN SATURATION: 98 % | HEART RATE: 63 BPM | RESPIRATION RATE: 18 BRPM

## 2023-10-24 VITALS
DIASTOLIC BLOOD PRESSURE: 85 MMHG | TEMPERATURE: 97.7 F | SYSTOLIC BLOOD PRESSURE: 158 MMHG | OXYGEN SATURATION: 97 % | RESPIRATION RATE: 18 BRPM | HEART RATE: 60 BPM

## 2023-10-24 VITALS
RESPIRATION RATE: 16 BRPM | OXYGEN SATURATION: 99 % | HEART RATE: 72 BPM | TEMPERATURE: 97.6 F | SYSTOLIC BLOOD PRESSURE: 159 MMHG | DIASTOLIC BLOOD PRESSURE: 83 MMHG

## 2023-10-24 VITALS — OXYGEN SATURATION: 96 % | HEART RATE: 67 BPM | RESPIRATION RATE: 18 BRPM

## 2023-10-24 VITALS
SYSTOLIC BLOOD PRESSURE: 159 MMHG | DIASTOLIC BLOOD PRESSURE: 83 MMHG | HEART RATE: 72 BPM | TEMPERATURE: 97.6 F | RESPIRATION RATE: 16 BRPM | OXYGEN SATURATION: 99 %

## 2023-10-24 VITALS
HEART RATE: 63 BPM | DIASTOLIC BLOOD PRESSURE: 70 MMHG | TEMPERATURE: 98 F | OXYGEN SATURATION: 98 % | SYSTOLIC BLOOD PRESSURE: 147 MMHG | RESPIRATION RATE: 18 BRPM

## 2023-10-24 VITALS
SYSTOLIC BLOOD PRESSURE: 118 MMHG | HEART RATE: 53 BPM | RESPIRATION RATE: 17 BRPM | OXYGEN SATURATION: 98 % | DIASTOLIC BLOOD PRESSURE: 67 MMHG | TEMPERATURE: 97.7 F

## 2023-10-24 VITALS
HEART RATE: 60 BPM | SYSTOLIC BLOOD PRESSURE: 163 MMHG | TEMPERATURE: 97.6 F | DIASTOLIC BLOOD PRESSURE: 83 MMHG | RESPIRATION RATE: 17 BRPM | OXYGEN SATURATION: 97 %

## 2023-10-24 VITALS — OXYGEN SATURATION: 95 % | RESPIRATION RATE: 18 BRPM | HEART RATE: 64 BPM

## 2023-10-24 RX ADMIN — ROPINIROLE SCH MG: 1 TABLET, FILM COATED ORAL at 20:56

## 2023-10-24 RX ADMIN — SODIUM CHLORIDE SCH MLS/HR: 9 INJECTION, SOLUTION INTRAVENOUS at 17:36

## 2023-10-24 RX ADMIN — METOPROLOL SUCCINATE SCH MG: 25 TABLET, EXTENDED RELEASE ORAL at 09:16

## 2023-10-24 RX ADMIN — Medication SCH MG: at 17:37

## 2023-10-24 RX ADMIN — SODIUM CHLORIDE SCH MG: 900 INJECTION INTRAVENOUS at 17:36

## 2023-10-24 RX ADMIN — Medication SCH MG: at 17:36

## 2023-10-24 RX ADMIN — ATORVASTATIN CALCIUM SCH MG: 10 TABLET, FILM COATED ORAL at 09:19

## 2023-10-24 RX ADMIN — PANTOPRAZOLE SODIUM SCH MG: 40 TABLET, DELAYED RELEASE ORAL at 09:16

## 2023-10-24 RX ADMIN — APIXABAN SCH MG: 2.5 TABLET, FILM COATED ORAL at 09:16

## 2023-10-24 RX ADMIN — Medication SCH MG: at 20:55

## 2023-10-24 RX ADMIN — ISOSORBIDE MONONITRATE SCH MG: 30 TABLET, EXTENDED RELEASE ORAL at 09:16

## 2023-10-24 RX ADMIN — LEVOTHYROXINE SODIUM SCH MCG: 0.11 TABLET ORAL at 05:44

## 2023-10-24 RX ADMIN — BARICITINIB SCH MG: 2 TABLET, FILM COATED ORAL at 17:36

## 2023-10-24 RX ADMIN — APIXABAN SCH MG: 2.5 TABLET, FILM COATED ORAL at 20:56

## 2023-10-24 RX ADMIN — CALCITRIOL SCH MCG: 0.25 CAPSULE, GELATIN COATED ORAL at 09:16

## 2023-10-24 RX ADMIN — AZITHROMYCIN SCH MG: 250 TABLET, FILM COATED ORAL at 14:28

## 2023-10-24 RX ADMIN — Medication PRN MG: at 14:38

## 2023-10-25 VITALS — DIASTOLIC BLOOD PRESSURE: 75 MMHG | HEART RATE: 52 BPM | TEMPERATURE: 97.5 F | SYSTOLIC BLOOD PRESSURE: 145 MMHG

## 2023-10-25 VITALS
RESPIRATION RATE: 16 BRPM | OXYGEN SATURATION: 100 % | DIASTOLIC BLOOD PRESSURE: 85 MMHG | TEMPERATURE: 98.8 F | SYSTOLIC BLOOD PRESSURE: 151 MMHG | HEART RATE: 59 BPM

## 2023-10-25 VITALS — HEART RATE: 57 BPM | RESPIRATION RATE: 16 BRPM | OXYGEN SATURATION: 94 %

## 2023-10-25 VITALS
OXYGEN SATURATION: 98 % | HEART RATE: 53 BPM | TEMPERATURE: 97.8 F | SYSTOLIC BLOOD PRESSURE: 158 MMHG | DIASTOLIC BLOOD PRESSURE: 85 MMHG | RESPIRATION RATE: 16 BRPM

## 2023-10-25 VITALS
SYSTOLIC BLOOD PRESSURE: 129 MMHG | OXYGEN SATURATION: 100 % | HEART RATE: 68 BPM | DIASTOLIC BLOOD PRESSURE: 82 MMHG | TEMPERATURE: 98.2 F | RESPIRATION RATE: 18 BRPM

## 2023-10-25 VITALS
OXYGEN SATURATION: 100 % | SYSTOLIC BLOOD PRESSURE: 181 MMHG | HEART RATE: 53 BPM | DIASTOLIC BLOOD PRESSURE: 78 MMHG | TEMPERATURE: 97.6 F | RESPIRATION RATE: 17 BRPM

## 2023-10-25 VITALS
DIASTOLIC BLOOD PRESSURE: 85 MMHG | SYSTOLIC BLOOD PRESSURE: 151 MMHG | RESPIRATION RATE: 16 BRPM | HEART RATE: 59 BPM | TEMPERATURE: 98.8 F | OXYGEN SATURATION: 100 %

## 2023-10-25 VITALS
RESPIRATION RATE: 18 BRPM | OXYGEN SATURATION: 99 % | HEART RATE: 118 BPM | SYSTOLIC BLOOD PRESSURE: 156 MMHG | DIASTOLIC BLOOD PRESSURE: 77 MMHG | TEMPERATURE: 97.8 F

## 2023-10-25 LAB
ALBUMIN SERPL-MCNC: 2.6 G/DL (ref 3.5–5)
ALBUMIN/GLOB SERPL: 0.8 {RATIO} (ref 0.8–2)
ALP SERPL-CCNC: 63 IU/L (ref 40–150)
ALT SERPL-CCNC: 32 IU/L (ref 0–55)
ANION GAP SERPL CALC-SCNC: 13.4 MMOL/L (ref 8–16)
BUN SERPL-MCNC: 49 MG/DL (ref 7–26)
BUN/CREAT SERPL: 36 (ref 6–25)
CALCIUM SERPL-MCNC: 7.7 MG/DL (ref 8.4–10.2)
CHLORIDE SERPL-SCNC: 105 MMOL/L (ref 98–107)
CO2 SERPL-SCNC: 24 MMOL/L (ref 22–29)
EOSINOPHIL NFR BLD MANUAL: 1 % (ref 0–7)
ERYTHROCYTE [DISTWIDTH] IN CORD BLOOD: 14.8 % (ref 11.7–14.4)
GLOBULIN PLAS-MCNC: 3.3 G/DL (ref 2.3–3.5)
GLUCOSE SERPLBLD-MCNC: 145 MG/DL (ref 74–118)
HCT VFR BLD AUTO: 29.3 % (ref 34.2–44.1)
HGB BLD-MCNC: 10.3 G/DL (ref 12–16)
LYMPHOCYTES NFR BLD MANUAL: 9 % (ref 19–48)
MCH RBC QN AUTO: 31.7 PG (ref 28–32)
MCHC RBC AUTO-ENTMCNC: 35.2 G/DL (ref 31–35)
MCV RBC AUTO: 90.2 FL (ref 81–99)
MONOCYTES NFR BLD MANUAL: 5 % (ref 3.4–9)
NEUTS SEG NFR BLD MANUAL: 85 % (ref 40–74)
PLAT MORPH BLD: NORMAL
PLATELET # BLD AUTO: 226 X10E3/UL (ref 140–360)
PLATELET # BLD EST: ADEQUATE 10*3/UL
POTASSIUM SERPL-SCNC: 4.4 MMOL/L (ref 3.5–5.1)
RBC # BLD AUTO: 3.25 X10E6/UL (ref 3.6–5.1)
RBC MORPH BLD: NORMAL
SODIUM SERPL-SCNC: 138 MMOL/L (ref 136–145)
WBC # BLD: 6.39 X10E3/UL (ref 4.8–10.8)

## 2023-10-25 RX ADMIN — SODIUM CHLORIDE SCH MLS/HR: 9 INJECTION, SOLUTION INTRAVENOUS at 16:49

## 2023-10-25 RX ADMIN — ATORVASTATIN CALCIUM SCH MG: 10 TABLET, FILM COATED ORAL at 09:25

## 2023-10-25 RX ADMIN — AZITHROMYCIN SCH MG: 250 TABLET, FILM COATED ORAL at 09:27

## 2023-10-25 RX ADMIN — CALCITRIOL SCH MCG: 0.25 CAPSULE, GELATIN COATED ORAL at 09:25

## 2023-10-25 RX ADMIN — ROPINIROLE SCH MG: 1 TABLET, FILM COATED ORAL at 20:50

## 2023-10-25 RX ADMIN — ISOSORBIDE MONONITRATE SCH MG: 30 TABLET, EXTENDED RELEASE ORAL at 09:27

## 2023-10-25 RX ADMIN — APIXABAN SCH MG: 2.5 TABLET, FILM COATED ORAL at 09:27

## 2023-10-25 RX ADMIN — BARICITINIB SCH MG: 2 TABLET, FILM COATED ORAL at 16:49

## 2023-10-25 RX ADMIN — Medication SCH MG: at 16:50

## 2023-10-25 RX ADMIN — SODIUM CHLORIDE SCH MG: 900 INJECTION INTRAVENOUS at 16:49

## 2023-10-25 RX ADMIN — LEVOTHYROXINE SODIUM SCH MCG: 0.11 TABLET ORAL at 05:40

## 2023-10-25 RX ADMIN — Medication SCH MG: at 09:25

## 2023-10-25 RX ADMIN — Medication SCH MG: at 20:50

## 2023-10-25 RX ADMIN — METOPROLOL SUCCINATE SCH MG: 25 TABLET, EXTENDED RELEASE ORAL at 09:26

## 2023-10-25 RX ADMIN — PANTOPRAZOLE SODIUM SCH MG: 40 TABLET, DELAYED RELEASE ORAL at 09:27

## 2023-10-25 RX ADMIN — APIXABAN SCH MG: 2.5 TABLET, FILM COATED ORAL at 20:50

## 2023-10-26 VITALS
TEMPERATURE: 97.3 F | HEART RATE: 52 BPM | OXYGEN SATURATION: 100 % | DIASTOLIC BLOOD PRESSURE: 80 MMHG | SYSTOLIC BLOOD PRESSURE: 170 MMHG | RESPIRATION RATE: 16 BRPM

## 2023-10-26 VITALS
HEART RATE: 65 BPM | OXYGEN SATURATION: 100 % | RESPIRATION RATE: 16 BRPM | SYSTOLIC BLOOD PRESSURE: 152 MMHG | TEMPERATURE: 97.5 F | DIASTOLIC BLOOD PRESSURE: 85 MMHG

## 2023-10-26 VITALS
TEMPERATURE: 98.6 F | RESPIRATION RATE: 18 BRPM | DIASTOLIC BLOOD PRESSURE: 86 MMHG | HEART RATE: 62 BPM | OXYGEN SATURATION: 96 % | SYSTOLIC BLOOD PRESSURE: 157 MMHG

## 2023-10-26 VITALS
OXYGEN SATURATION: 98 % | RESPIRATION RATE: 16 BRPM | DIASTOLIC BLOOD PRESSURE: 86 MMHG | TEMPERATURE: 98.6 F | SYSTOLIC BLOOD PRESSURE: 157 MMHG | HEART RATE: 54 BPM

## 2023-10-26 VITALS
SYSTOLIC BLOOD PRESSURE: 159 MMHG | OXYGEN SATURATION: 100 % | HEART RATE: 54 BPM | TEMPERATURE: 97.5 F | RESPIRATION RATE: 16 BRPM | DIASTOLIC BLOOD PRESSURE: 80 MMHG

## 2023-10-26 VITALS — RESPIRATION RATE: 18 BRPM | HEART RATE: 62 BPM | OXYGEN SATURATION: 96 %

## 2023-10-26 RX ADMIN — APIXABAN SCH MG: 2.5 TABLET, FILM COATED ORAL at 08:49

## 2023-10-26 RX ADMIN — ISOSORBIDE MONONITRATE SCH MG: 30 TABLET, EXTENDED RELEASE ORAL at 08:47

## 2023-10-26 RX ADMIN — PANTOPRAZOLE SODIUM SCH MG: 40 TABLET, DELAYED RELEASE ORAL at 08:49

## 2023-10-26 RX ADMIN — Medication SCH MG: at 08:50

## 2023-10-26 RX ADMIN — ATORVASTATIN CALCIUM SCH MG: 10 TABLET, FILM COATED ORAL at 08:49

## 2023-10-26 RX ADMIN — CALCITRIOL SCH MCG: 0.25 CAPSULE, GELATIN COATED ORAL at 08:49

## 2023-10-26 RX ADMIN — METOPROLOL SUCCINATE SCH MG: 25 TABLET, EXTENDED RELEASE ORAL at 08:50

## 2023-10-26 RX ADMIN — LEVOTHYROXINE SODIUM SCH MCG: 0.11 TABLET ORAL at 05:24
